# Patient Record
Sex: MALE | Race: WHITE | Employment: OTHER | URBAN - METROPOLITAN AREA
[De-identification: names, ages, dates, MRNs, and addresses within clinical notes are randomized per-mention and may not be internally consistent; named-entity substitution may affect disease eponyms.]

---

## 2017-02-26 ENCOUNTER — HOSPITAL ENCOUNTER (INPATIENT)
Facility: HOSPITAL | Age: 77
LOS: 2 days | Discharge: HOME | DRG: 194 | End: 2017-02-28
Attending: EMERGENCY MEDICINE | Admitting: HOSPITALIST
Payer: COMMERCIAL

## 2017-02-26 ENCOUNTER — APPOINTMENT (OUTPATIENT)
Dept: GENERAL RADIOLOGY | Facility: HOSPITAL | Age: 77
DRG: 194 | End: 2017-02-26
Attending: EMERGENCY MEDICINE
Payer: COMMERCIAL

## 2017-02-26 DIAGNOSIS — Z95.2 MECHANICAL HEART VALVE PRESENT: ICD-10-CM

## 2017-02-26 DIAGNOSIS — N17.9 AKI (ACUTE KIDNEY INJURY) (HCC): ICD-10-CM

## 2017-02-26 DIAGNOSIS — N28.9 RENAL INSUFFICIENCY: ICD-10-CM

## 2017-02-26 DIAGNOSIS — J18.9 COMMUNITY ACQUIRED PNEUMONIA: Primary | ICD-10-CM

## 2017-02-26 PROBLEM — Z91.81 AT RISK FOR FALLING: Status: ACTIVE | Noted: 2017-02-26

## 2017-02-26 LAB
ADENOVIRUS PCR:: NEGATIVE
ALBUMIN SERPL-MCNC: 4.1 G/DL (ref 3.5–4.8)
ALP LIVER SERPL-CCNC: 257 U/L (ref 45–117)
ALT SERPL-CCNC: 31 U/L (ref 17–63)
AST SERPL-CCNC: 27 U/L (ref 15–41)
B PERT DNA SPEC QL NAA+PROBE: NEGATIVE
BASOPHILS # BLD AUTO: 0.02 X10(3) UL (ref 0–0.1)
BASOPHILS NFR BLD AUTO: 0.3 %
BILIRUB SERPL-MCNC: 0.5 MG/DL (ref 0.1–2)
BUN BLD-MCNC: 72 MG/DL (ref 8–20)
C PNEUM DNA SPEC QL NAA+PROBE: NEGATIVE
CALCIUM BLD-MCNC: 8.5 MG/DL (ref 8.3–10.3)
CHLORIDE: 104 MMOL/L (ref 101–111)
CO2: 23 MMOL/L (ref 22–32)
CORONAVIRUS 229E PCR:: NEGATIVE
CORONAVIRUS HKU1 PCR:: NEGATIVE
CORONAVIRUS NL63 PCR:: NEGATIVE
CORONAVIRUS OC43 PCR:: NEGATIVE
CREAT BLD-MCNC: 2.49 MG/DL (ref 0.7–1.3)
EOSINOPHIL # BLD AUTO: 0.02 X10(3) UL (ref 0–0.3)
EOSINOPHIL NFR BLD AUTO: 0.3 %
ERYTHROCYTE [DISTWIDTH] IN BLOOD BY AUTOMATED COUNT: 14.6 % (ref 11.5–16)
EST. AVERAGE GLUCOSE BLD GHB EST-MCNC: 126 MG/DL (ref 68–126)
FLUAV H1 2009 PAND RNA SPEC QL NAA+PROBE: NEGATIVE
FLUAV H1 RNA SPEC QL NAA+PROBE: NEGATIVE
FLUAV H3 RNA SPEC QL NAA+PROBE: POSITIVE
FLUAV RNA SPEC QL NAA+PROBE: NEGATIVE
FLUBV RNA SPEC QL NAA+PROBE: POSITIVE
GLUCOSE BLD-MCNC: 116 MG/DL (ref 70–99)
GLUCOSE BLD-MCNC: 127 MG/DL (ref 65–99)
GLUCOSE BLD-MCNC: 134 MG/DL (ref 65–99)
GLUCOSE BLD-MCNC: 176 MG/DL (ref 65–99)
HBA1C MFR BLD HPLC: 6 % (ref ?–5.7)
HCT VFR BLD AUTO: 33.3 % (ref 37–53)
HGB BLD-MCNC: 11.4 G/DL (ref 13–17)
IMMATURE GRANULOCYTE COUNT: 0.04 X10(3) UL (ref 0–1)
IMMATURE GRANULOCYTE RATIO %: 0.6 %
INR BLD: 2.75 (ref 0.89–1.12)
LYMPHOCYTES # BLD AUTO: 0.59 X10(3) UL (ref 0.9–4)
LYMPHOCYTES NFR BLD AUTO: 8.5 %
M PROTEIN MFR SERPL ELPH: 7.5 G/DL (ref 6.1–8.3)
MCH RBC QN AUTO: 32.7 PG (ref 27–33.2)
MCHC RBC AUTO-ENTMCNC: 34.2 G/DL (ref 31–37)
MCV RBC AUTO: 95.4 FL (ref 80–99)
METAPNEUMOVIRUS PCR:: NEGATIVE
MONOCYTES # BLD AUTO: 0.9 X10(3) UL (ref 0.1–0.6)
MONOCYTES NFR BLD AUTO: 12.9 %
MYCOPLASMA PNEUMONIA PCR:: NEGATIVE
NEUTROPHIL ABS PRELIM: 5.39 X10 (3) UL (ref 1.3–6.7)
NEUTROPHILS # BLD AUTO: 5.39 X10(3) UL (ref 1.3–6.7)
NEUTROPHILS NFR BLD AUTO: 77.4 %
PARAINFLUENZA 1 PCR:: NEGATIVE
PARAINFLUENZA 2 PCR:: NEGATIVE
PARAINFLUENZA 3 PCR:: NEGATIVE
PARAINFLUENZA 4 PCR:: NEGATIVE
PLATELET # BLD AUTO: 90 10(3)UL (ref 150–450)
POTASSIUM SERPL-SCNC: 4.8 MMOL/L (ref 3.6–5.1)
PSA SERPL DL<=0.01 NG/ML-MCNC: 30.1 SECONDS (ref 12.3–14.8)
RBC # BLD AUTO: 3.49 X10(6)UL (ref 3.8–5.8)
RED CELL DISTRIBUTION WIDTH-SD: 50.1 FL (ref 35.1–46.3)
RHINOVIRUS/ENTERO PCR:: NEGATIVE
RSV RNA SPEC QL NAA+PROBE: NEGATIVE
SODIUM SERPL-SCNC: 138 MMOL/L (ref 136–144)
WBC # BLD AUTO: 7 X10(3) UL (ref 4–13)

## 2017-02-26 PROCEDURE — 99223 1ST HOSP IP/OBS HIGH 75: CPT | Performed by: STUDENT IN AN ORGANIZED HEALTH CARE EDUCATION/TRAINING PROGRAM

## 2017-02-26 PROCEDURE — 71020 XR CHEST PA + LAT CHEST (CPT=71020): CPT

## 2017-02-26 RX ORDER — WARFARIN SODIUM 2.5 MG/1
2.5 TABLET ORAL NIGHTLY
Status: DISCONTINUED | OUTPATIENT
Start: 2017-02-26 | End: 2017-02-28

## 2017-02-26 RX ORDER — IPRATROPIUM BROMIDE AND ALBUTEROL SULFATE 2.5; .5 MG/3ML; MG/3ML
3 SOLUTION RESPIRATORY (INHALATION) ONCE
Status: COMPLETED | OUTPATIENT
Start: 2017-02-26 | End: 2017-02-26

## 2017-02-26 RX ORDER — DEXTROSE MONOHYDRATE 25 G/50ML
50 INJECTION, SOLUTION INTRAVENOUS
Status: DISCONTINUED | OUTPATIENT
Start: 2017-02-26 | End: 2017-02-28

## 2017-02-26 RX ORDER — HYDRALAZINE HYDROCHLORIDE 25 MG/1
25 TABLET, FILM COATED ORAL 3 TIMES DAILY
Status: DISCONTINUED | OUTPATIENT
Start: 2017-02-26 | End: 2017-02-28

## 2017-02-26 RX ORDER — TERAZOSIN 5 MG/1
5 CAPSULE ORAL NIGHTLY
Status: DISCONTINUED | OUTPATIENT
Start: 2017-02-26 | End: 2017-02-28

## 2017-02-26 RX ORDER — TRAZODONE HYDROCHLORIDE 50 MG/1
50 TABLET ORAL NIGHTLY PRN
Status: DISCONTINUED | OUTPATIENT
Start: 2017-02-26 | End: 2017-02-28

## 2017-02-26 RX ORDER — ACETAMINOPHEN 500 MG
1000 TABLET ORAL ONCE
Status: COMPLETED | OUTPATIENT
Start: 2017-02-26 | End: 2017-02-26

## 2017-02-26 RX ORDER — ATORVASTATIN CALCIUM 20 MG/1
20 TABLET, FILM COATED ORAL NIGHTLY
Status: DISCONTINUED | OUTPATIENT
Start: 2017-02-26 | End: 2017-02-28

## 2017-02-26 RX ORDER — TERAZOSIN 5 MG/1
5 CAPSULE ORAL DAILY
COMMUNITY

## 2017-02-26 RX ORDER — PHENOBARBITAL 97.2 MG/1
32.4 TABLET ORAL 3 TIMES DAILY
COMMUNITY
End: 2018-08-23

## 2017-02-26 RX ORDER — WARFARIN SODIUM 2.5 MG/1
1 TABLET ORAL NIGHTLY
COMMUNITY
End: 2018-08-23

## 2017-02-26 RX ORDER — CARVEDILOL 3.12 MG/1
3.12 TABLET ORAL 2 TIMES DAILY WITH MEALS
Status: DISCONTINUED | OUTPATIENT
Start: 2017-02-26 | End: 2017-02-28

## 2017-02-26 RX ORDER — LISINOPRIL 5 MG/1
5 TABLET ORAL DAILY
COMMUNITY
End: 2018-08-23

## 2017-02-26 RX ORDER — FOLIC ACID 1 MG/1
TABLET ORAL DAILY
COMMUNITY
End: 2018-08-23

## 2017-02-26 RX ORDER — SODIUM CHLORIDE 9 MG/ML
INJECTION, SOLUTION INTRAVENOUS CONTINUOUS
Status: ACTIVE | OUTPATIENT
Start: 2017-02-26 | End: 2017-02-27

## 2017-02-26 RX ORDER — ACETAMINOPHEN 325 MG/1
650 TABLET ORAL EVERY 6 HOURS PRN
Status: DISCONTINUED | OUTPATIENT
Start: 2017-02-26 | End: 2017-02-28

## 2017-02-26 RX ORDER — ONDANSETRON 2 MG/ML
4 INJECTION INTRAMUSCULAR; INTRAVENOUS EVERY 6 HOURS PRN
Status: DISCONTINUED | OUTPATIENT
Start: 2017-02-26 | End: 2017-02-28

## 2017-02-26 RX ORDER — OSELTAMIVIR PHOSPHATE 75 MG/1
75 CAPSULE ORAL DAILY
Status: DISCONTINUED | OUTPATIENT
Start: 2017-02-26 | End: 2017-02-28

## 2017-02-26 RX ORDER — HYDRALAZINE HYDROCHLORIDE 25 MG/1
25 TABLET, FILM COATED ORAL 3 TIMES DAILY
COMMUNITY
End: 2018-08-23

## 2017-02-26 RX ORDER — HYDROCHLOROTHIAZIDE 25 MG/1
25 TABLET ORAL DAILY
Status: ON HOLD | COMMUNITY
End: 2017-02-28

## 2017-02-26 RX ORDER — CARVEDILOL 3.12 MG/1
3.12 TABLET ORAL 2 TIMES DAILY WITH MEALS
COMMUNITY
End: 2018-08-23

## 2017-02-26 RX ORDER — FUROSEMIDE 20 MG/1
20 TABLET ORAL DAILY
COMMUNITY
End: 2018-08-23

## 2017-02-26 RX ORDER — ATORVASTATIN CALCIUM 20 MG/1
20 TABLET, FILM COATED ORAL NIGHTLY
COMMUNITY

## 2017-02-26 RX ORDER — GUAIFENESIN 600 MG
600 TABLET, EXTENDED RELEASE 12 HR ORAL 2 TIMES DAILY
Status: DISCONTINUED | OUTPATIENT
Start: 2017-02-26 | End: 2017-02-28

## 2017-02-26 RX ORDER — GLIPIZIDE 5 MG/1
5 TABLET ORAL 2 TIMES DAILY
COMMUNITY
End: 2018-08-23

## 2017-02-26 RX ORDER — LAMOTRIGINE 150 MG/1
150 TABLET ORAL 2 TIMES DAILY
COMMUNITY
End: 2018-08-23

## 2017-02-26 NOTE — H&P
[facility] HOSPITALIST  History and Physical     Maynor Slade Patient Status:  Emergency    1940 MRN [id number]   Location [facility] [facility] Attending Love Page MD   Hosp Day # 0 PCP Nik Mackey DO     Chief Complai nontender, nondistended. Positive bowel sounds. No rebound, guarding or organomegaly. Neurologic: No focal neurological deficits. CNII-XII grossly intact. Musculoskeletal: Moves all extremities. Extremities: +2 edema   Integument: No rashes or lesions.

## 2017-02-26 NOTE — ED PROVIDER NOTES
Patient Seen in: [facility] Emergency Department    History   Patient presents with:  Cough/URI    Stated Complaint: cough, no fever, no shortness of breath    HPI    66-year-old white male who presents to the emergency room today for complaint of c stated complaint: cough, no fever, no shortness of breath  Other systems are as noted in HPI. Constitutional and vital signs reviewed. All other systems reviewed and negative except as noted above.     PSFH elements reviewed from today and agreed exce INR 2.75 (*)     All other components within normal limits   CBC W/ DIFFERENTIAL - Abnormal; Notable for the following:     RBC 3.49 (*)     HGB 11.4 (*)     HCT 33.3 (*)     PLT 90.0 (*)     RDW-SD 50.1 (*)     Lymphocyte Absolute 0.59 (*)     Monocyte Ab insufficiency    Disposition:  Admit    Follow-up:  No follow-up provider specified.     Medications Prescribed:  Current Discharge Medication List        Present on Admission           ICD-10-CM Noted POA    Community acquired pneumonia J18.9 2/26/2017 Diane

## 2017-02-26 NOTE — CONSULTS
[facility]  Report of Consultation    Ronnie Mc Patient Status:  Inpatient    1940 MRN [id number]   [facility] 5NW-A Attending Bubba Salinas MD   Hosp Day # 0 PCP Lamar Loomis DO     Reason for Consultation:  Valve Nightly PRN  •  ondansetron HCl (ZOFRAN) injection 4 mg, 4 mg, Intravenous, Q6H PRN  •  glucose (DEX4) oral liquid 15 g, 15 g, Oral, Q15 Min PRN **OR** Glucose-Vitamin C (DEX-4) 4-0.006 g chewable tab 4 tablet, 4 tablet, Oral, Q15 Min PRN **OR** dextrose i MD  2/26/2017  2:40 PM

## 2017-02-27 ENCOUNTER — APPOINTMENT (OUTPATIENT)
Dept: ULTRASOUND IMAGING | Facility: HOSPITAL | Age: 77
DRG: 194 | End: 2017-02-27
Attending: STUDENT IN AN ORGANIZED HEALTH CARE EDUCATION/TRAINING PROGRAM
Payer: COMMERCIAL

## 2017-02-27 ENCOUNTER — APPOINTMENT (OUTPATIENT)
Dept: CV DIAGNOSTICS | Facility: HOSPITAL | Age: 77
DRG: 194 | End: 2017-02-27
Attending: INTERNAL MEDICINE
Payer: COMMERCIAL

## 2017-02-27 LAB
ALBUMIN SERPL-MCNC: 3.3 G/DL (ref 3.5–4.8)
ALP LIVER SERPL-CCNC: 196 U/L (ref 45–117)
ALT SERPL-CCNC: 24 U/L (ref 17–63)
AST SERPL-CCNC: 25 U/L (ref 15–41)
ATRIAL RATE: 208 BPM
BASOPHILS # BLD AUTO: 0.02 X10(3) UL (ref 0–0.1)
BASOPHILS NFR BLD AUTO: 0.4 %
BILIRUB SERPL-MCNC: 0.4 MG/DL (ref 0.1–2)
BUN BLD-MCNC: 74 MG/DL (ref 8–20)
CALCIUM BLD-MCNC: 8.2 MG/DL (ref 8.3–10.3)
CHLORIDE: 109 MMOL/L (ref 101–111)
CO2: 18 MMOL/L (ref 22–32)
CREAT BLD-MCNC: 2.39 MG/DL (ref 0.7–1.3)
DEPRECATED HBV CORE AB SER IA-ACNC: 243.9 NG/ML (ref 22–322)
EOSINOPHIL # BLD AUTO: 0.01 X10(3) UL (ref 0–0.3)
EOSINOPHIL NFR BLD AUTO: 0.2 %
ERYTHROCYTE [DISTWIDTH] IN BLOOD BY AUTOMATED COUNT: 14.7 % (ref 11.5–16)
FOLATE (FOLIC ACID), SERUM: 17.2 NG/ML (ref 8.7–24)
FREE T4: 1.2 NG/DL (ref 0.9–1.8)
GLUCOSE BLD-MCNC: 101 MG/DL (ref 65–99)
GLUCOSE BLD-MCNC: 111 MG/DL (ref 65–99)
GLUCOSE BLD-MCNC: 111 MG/DL (ref 70–99)
GLUCOSE BLD-MCNC: 182 MG/DL (ref 65–99)
GLUCOSE BLD-MCNC: 197 MG/DL (ref 65–99)
HAV AB SERPL IA-ACNC: 756 PG/ML (ref 193–986)
HCT VFR BLD AUTO: 28.1 % (ref 37–53)
HGB BLD-MCNC: 9.3 G/DL (ref 13–17)
IMMATURE GRANULOCYTE COUNT: 0.03 X10(3) UL (ref 0–1)
IMMATURE GRANULOCYTE RATIO %: 0.6 %
IRON SATURATION: 7 % (ref 13–45)
IRON: 17 UG/DL (ref 45–182)
LYMPHOCYTES # BLD AUTO: 0.63 X10(3) UL (ref 0.9–4)
LYMPHOCYTES NFR BLD AUTO: 11.7 %
M PROTEIN MFR SERPL ELPH: 6.2 G/DL (ref 6.1–8.3)
MCH RBC QN AUTO: 32.2 PG (ref 27–33.2)
MCHC RBC AUTO-ENTMCNC: 33.1 G/DL (ref 31–37)
MCV RBC AUTO: 97.2 FL (ref 80–99)
MONOCYTES # BLD AUTO: 0.9 X10(3) UL (ref 0.1–0.6)
MONOCYTES NFR BLD AUTO: 16.7 %
NEUTROPHIL ABS PRELIM: 3.81 X10 (3) UL (ref 1.3–6.7)
NEUTROPHILS # BLD AUTO: 3.81 X10(3) UL (ref 1.3–6.7)
NEUTROPHILS NFR BLD AUTO: 70.4 %
PLATELET # BLD AUTO: 71 10(3)UL (ref 150–450)
POTASSIUM SERPL-SCNC: 4.6 MMOL/L (ref 3.6–5.1)
Q-T INTERVAL: 418 MS
QRS DURATION: 138 MS
QTC CALCULATION (BEZET): 485 MS
R AXIS: -64 DEGREES
RBC # BLD AUTO: 2.89 X10(6)UL (ref 3.8–5.8)
RED CELL DISTRIBUTION WIDTH-SD: 52 FL (ref 35.1–46.3)
SODIUM SERPL-SCNC: 140 MMOL/L (ref 136–144)
T AXIS: 112 DEGREES
TOTAL IRON BINDING CAPACITY: 228 UG/DL (ref 298–536)
TRANSFERRIN: 153 MG/DL (ref 200–360)
TSI SER-ACNC: 0.08 MIU/ML (ref 0.35–5.5)
VENTRICULAR RATE: 81 BPM
WBC # BLD AUTO: 5.4 X10(3) UL (ref 4–13)

## 2017-02-27 PROCEDURE — 93306 TTE W/DOPPLER COMPLETE: CPT

## 2017-02-27 PROCEDURE — 99233 SBSQ HOSP IP/OBS HIGH 50: CPT | Performed by: STUDENT IN AN ORGANIZED HEALTH CARE EDUCATION/TRAINING PROGRAM

## 2017-02-27 PROCEDURE — 93306 TTE W/DOPPLER COMPLETE: CPT | Performed by: INTERNAL MEDICINE

## 2017-02-27 PROCEDURE — 76770 US EXAM ABDO BACK WALL COMP: CPT

## 2017-02-27 RX ORDER — BENZONATATE 200 MG/1
200 CAPSULE ORAL 3 TIMES DAILY PRN
Status: DISCONTINUED | OUTPATIENT
Start: 2017-02-27 | End: 2017-02-28

## 2017-02-27 RX ORDER — ALBUTEROL SULFATE 2.5 MG/3ML
2.5 SOLUTION RESPIRATORY (INHALATION)
Status: DISCONTINUED | OUTPATIENT
Start: 2017-02-27 | End: 2017-02-28

## 2017-02-27 NOTE — PROGRESS NOTES
[facility]  Progress Note    Yulissa Benites Patient Status:  Inpatient    1940 MRN [id number]   [facility] 5NW-A Attending Gerry Negro MD   Hosp Day # 1 PCP Keanu Thomas DO       Subjective:  Sitting up in chair.  Still c/ HCl  25 mg Oral TID   • lamoTRIgine  150 mg Oral BID   • PHENobarbital  97.2 mg Oral Daily   • Terazosin HCl  5 mg Oral Nightly   • Warfarin Sodium  2.5 mg Oral Nightly   • cefTRIAXone  1 g Intravenous Q24H   • azithromycin  500 mg Intravenous Q24H   • ins myself upon discharge for cardiac follow-up. Would resume ACE-I and lasix upon discharge; upon follow-up in 1-2 weeks would get repeat BMP and follow BP to see if can increase ACE-I rather than resume HCTZ.     Evy Powell MD

## 2017-02-27 NOTE — PLAN OF CARE
CARDIOVASCULAR - ADULT    • Maintains optimal cardiac output and hemodynamic stability Progressing        Diabetes/Glucose Control    • Glucose maintained within prescribed range Progressing        Impaired Functional Mobility    • Achieve highest/safest l

## 2017-02-27 NOTE — PAYOR COMM NOTE
Attending Physician: Carlos Rossi MD    Review Type: ADMISSION   Reviewer: Jorge James       Date: February 27, 2017 - 12:01 PM  Payor: [address] [place] [address] Road Number: :[id number]  Admit date: 2/26/2017 10:09 AM   Admitted from Emergency Dept. 2/26/2017 1206 New Bag 1 g Intravenous Pastor Francesca RN      CefTRIAXone Sodium (ROCEPHIN) 1 g in sodium chloride 0.9 % 100 mL IVPB Add-vantage     Date Action Dose Route User    2/27/2017 1108 New Bag 1 g Intravenous [facility]      phenobarbital (LUMINAL) tab 97.2 mg     Date Action Dose Route User    2/27/2017 0827 Given 97.2 mg Oral Daniela Mantilla RN    2/26/2017 1448 Given 97.2 mg Oral Adalberto Young RN          RESULTS LAST 24HRS:  Labs Reviewed   PROTHROMBIN cards  4. Afib on AG  1. Continue BB  2. Continue coumadin    5.  Mechanical Aortic and Mitral Valve - continue coumadin, daily Pt/INR

## 2017-02-27 NOTE — CM/SW NOTE
02/27/17 1600   CM/SW Referral Data   Referral Source Physician   Reason for Referral Discharge planning;Psychoscial assessment   Informant Patient   Pertinent Medical Hx   Primary Care Physician Name Sylvia Patricia.    Patient Info   Patient's Mental Status

## 2017-02-27 NOTE — PROGRESS NOTES
Pt is a 69 y/o male admitted with PNA. alert oriented. denies SOB. 02 sats 93-97% on room air. no swallowing difficulty noted. HOB elevated. severe cough. Robitussin given,pt is on iv zithromax,Rocephin and po tamiflu. PT/OT eval.up in the chair. no fever,N/V noted

## 2017-02-27 NOTE — PROGRESS NOTES
[facility]PITAL[facility]  Progress Note     Luis Williamson Patient Status:  Inpatient    1940 MRN [id number]   [facility] 5NW-A Attending Cole Centeno MD   Hosp Day # 1 PCP Melanie Hidalgo DO     Chief Complaint: Fatigue, cough      S: carvedilol  3.125 mg Oral BID with meals   • hydrALAzine HCl  25 mg Oral TID   • lamoTRIgine  150 mg Oral BID   • PHENobarbital  97.2 mg Oral Daily   • Terazosin HCl  5 mg Oral Nightly   • Warfarin Sodium  2.5 mg Oral Nightly   • cefTRIAXone  1 g Intraveno

## 2017-02-27 NOTE — PHYSICAL THERAPY NOTE
PHYSICAL THERAPY EVALUATION - INPATIENT     Room Number: 855/268-X  Evaluation Date: 2/27/2017  Type of Evaluation: Initial  Physician Order: PT Eval and Treat    Presenting Problem: Pneumonia  Reason for Therapy: Mobility Dysfunction and Discharge Wayne identify errors made and assistance required to correct errors made  · Awareness of Deficits:  decreased awareness of deficits  · Problem Solving:  assistance required to identify errors made, assistance required to generate solutions and assistance requir front of him. Pt stating was awaiting someone to assist with \"getting to food. \"  Pt demos supine to sit with min A. Sit to/from stand with min A. Ambulation x5ft without AD and min A.  x145ft with RW with CGA. Pt with short shuffled gait.   Noted to h negotiate 1 flight of stairs with supervision to ensure safety at home. Goal #5 Patient will tolerate standardized assessment within 3 visits.     Goal #6    Goal Comments: Goals established on 2/27/2017

## 2017-02-27 NOTE — PROGRESS NOTES
[facility] Pharmacy Note:  Renal Adjustment for Tamiflu (oseltamivir)    Elo Garcia is a 68year old male who has been prescribed Tamiflu (oseltamivir) 75 mg every 12 hrs. CrCl is estimated creatinine clearance is 23.6 mL/min (based on Cr of 2.49).  so the

## 2017-02-27 NOTE — PROGRESS NOTES
RECEIVED PATIENT AT 1400, ALERT & ORIENTED. STABLE. CARDIOLOGY NOTIFIED OF EF RESULTS AND IV FLUIDS, MONITOR.

## 2017-02-28 VITALS
DIASTOLIC BLOOD PRESSURE: 74 MMHG | BODY MASS INDEX: 26.99 KG/M2 | OXYGEN SATURATION: 99 % | TEMPERATURE: 98 F | HEIGHT: 67 IN | WEIGHT: 171.94 LBS | RESPIRATION RATE: 18 BRPM | HEART RATE: 75 BPM | SYSTOLIC BLOOD PRESSURE: 115 MMHG

## 2017-02-28 LAB
BASOPHILS # BLD AUTO: 0.03 X10(3) UL (ref 0–0.1)
BASOPHILS NFR BLD AUTO: 1 %
BUN BLD-MCNC: 65 MG/DL (ref 8–20)
CALCIUM BLD-MCNC: 8.6 MG/DL (ref 8.3–10.3)
CHLORIDE: 112 MMOL/L (ref 101–111)
CO2: 18 MMOL/L (ref 22–32)
CREAT BLD-MCNC: 1.88 MG/DL (ref 0.7–1.3)
EOSINOPHIL # BLD AUTO: 0.09 X10(3) UL (ref 0–0.3)
EOSINOPHIL NFR BLD AUTO: 3.1 %
ERYTHROCYTE [DISTWIDTH] IN BLOOD BY AUTOMATED COUNT: 14.8 % (ref 11.5–16)
GLUCOSE BLD-MCNC: 115 MG/DL (ref 65–99)
GLUCOSE BLD-MCNC: 120 MG/DL (ref 70–99)
GLUCOSE BLD-MCNC: 152 MG/DL (ref 65–99)
HCT VFR BLD AUTO: 28.5 % (ref 37–53)
HGB BLD-MCNC: 9.6 G/DL (ref 13–17)
IMMATURE GRANULOCYTE COUNT: 0.02 X10(3) UL (ref 0–1)
IMMATURE GRANULOCYTE RATIO %: 0.7 %
INR BLD: 3.48 (ref 0.89–1.12)
LYMPHOCYTES # BLD AUTO: 0.56 X10(3) UL (ref 0.9–4)
LYMPHOCYTES NFR BLD AUTO: 19.3 %
MCH RBC QN AUTO: 32.9 PG (ref 27–33.2)
MCHC RBC AUTO-ENTMCNC: 33.7 G/DL (ref 31–37)
MCV RBC AUTO: 97.6 FL (ref 80–99)
MONOCYTES # BLD AUTO: 0.38 X10(3) UL (ref 0.1–0.6)
MONOCYTES NFR BLD AUTO: 13.1 %
NEUTROPHIL ABS PRELIM: 1.82 X10 (3) UL (ref 1.3–6.7)
NEUTROPHILS # BLD AUTO: 1.82 X10(3) UL (ref 1.3–6.7)
NEUTROPHILS NFR BLD AUTO: 62.8 %
PLATELET # BLD AUTO: 65 10(3)UL (ref 150–450)
POTASSIUM SERPL-SCNC: 4.1 MMOL/L (ref 3.6–5.1)
PSA SERPL DL<=0.01 NG/ML-MCNC: 36.3 SECONDS (ref 12.3–14.8)
RBC # BLD AUTO: 2.92 X10(6)UL (ref 3.8–5.8)
RED CELL DISTRIBUTION WIDTH-SD: 52.5 FL (ref 35.1–46.3)
SODIUM SERPL-SCNC: 142 MMOL/L (ref 136–144)
WBC # BLD AUTO: 2.9 X10(3) UL (ref 4–13)

## 2017-02-28 RX ORDER — ALBUTEROL SULFATE 2.5 MG/3ML
2.5 SOLUTION RESPIRATORY (INHALATION)
Status: DISCONTINUED | OUTPATIENT
Start: 2017-02-28 | End: 2017-02-28

## 2017-02-28 RX ORDER — OSELTAMIVIR PHOSPHATE 75 MG/1
75 CAPSULE ORAL DAILY
Qty: 2 CAPSULE | Refills: 0 | Status: SHIPPED | OUTPATIENT
Start: 2017-02-28 | End: 2017-03-02

## 2017-02-28 RX ORDER — CEFDINIR 300 MG/1
300 CAPSULE ORAL 2 TIMES DAILY
Qty: 10 CAPSULE | Refills: 0 | Status: SHIPPED | OUTPATIENT
Start: 2017-02-28 | End: 2017-03-05

## 2017-02-28 RX ORDER — BENZONATATE 100 MG/1
100 CAPSULE ORAL 3 TIMES DAILY PRN
Qty: 30 CAPSULE | Refills: 0 | Status: SHIPPED | OUTPATIENT
Start: 2017-02-28 | End: 2018-08-23

## 2017-02-28 RX ORDER — ALBUTEROL SULFATE 90 UG/1
1 AEROSOL, METERED RESPIRATORY (INHALATION) EVERY 4 HOURS PRN
Qty: 1 INHALER | Refills: 0 | Status: SHIPPED | OUTPATIENT
Start: 2017-02-28 | End: 2018-08-23

## 2017-02-28 RX ORDER — GUAIFENESIN 600 MG
600 TABLET, EXTENDED RELEASE 12 HR ORAL 2 TIMES DAILY
Qty: 20 TABLET | Refills: 0 | Status: SHIPPED | OUTPATIENT
Start: 2017-02-28 | End: 2018-08-23

## 2017-02-28 RX ORDER — WARFARIN SODIUM 2.5 MG/1
2.5 TABLET ORAL NIGHTLY
Qty: 30 TABLET | Refills: 0 | Status: SHIPPED | OUTPATIENT
Start: 2017-02-28 | End: 2018-08-23

## 2017-02-28 NOTE — PROGRESS NOTES
[facility]  Cardiology Progress Note    Naheed Florez Patient Status:  Inpatient    1940 MRN [id number]   [facility] 5NW-A Attending Carolina Das MD   Hosp Day # 2 PCP Valentine Vazquez DO     Subjective:  Up in chair.  Product 500 mg Intravenous Q24H   • insulin aspart  1-5 Units Subcutaneous TID CC and HS   • GuaiFENesin ER  600 mg Oral BID   • Oseltamivir Phosphate  75 mg Oral Daily          Assessment:  · Pneumonia,  +Influenza A and B - antibiotics and Tamiflu per primary  ·

## 2017-02-28 NOTE — OCCUPATIONAL THERAPY NOTE
OCCUPATIONAL THERAPY EVALUATION - INPATIENT     Room Number: 023/786-W  Evaluation Date: 2/28/2017  Type of Evaluation: Initial  Presenting Problem: PNA and influenza     Physician Order: IP Consult to Occupational Therapy  Reason for Therapy: ADL/IADL Dys COGNITION  Safety Judgement:  decreased awareness of need for assistance and decreased awareness of need for safety  Awareness of Deficits:  decreased awareness of deficits  Problem Solving:  assistance required to identify errors made, assistance requ performed supine>sit EOB with moderate assistance. Pt performed sit>stand with RW and CG assistance with min verbal/visual/tactile cues for safety with RW use and hand placement.  Pt performed functional mobility with min assistance and RW x approx 75ft>bed Goals  Patient will transfer from bed to chair:  with supervision  Patient will transfer from supine to sit:  with supervision  Patient will transfer from sit to stand:  with supervision  Patient will transfer to toilet:  with supervision    RAVINE Dhara Pr

## 2017-02-28 NOTE — PROGRESS NOTES
Assumed care of pt at 2230. Pt asleep at this time. Pt maintaining O2 saturation >90% on room air, . Pt on tele, afib with PVCs. Pt up with assist, BRP, bedrest at this time, fall precautions in place. IVF infusing at [facility].  Frequent rounding, needs met

## 2017-02-28 NOTE — DISCHARGE SUMMARY
[facility] [place] HOSPITALIST  DISCHARGE SUMMARY     Kelly Florian Patient Status:  Inpatient    1940 MRN [id number]   [facility] 5NW-A Attending Veronica Whalen MD   [facility] Day # 2 PCP Andrew Collins DO     Date of Admission: 2017  Date o positive for influenza A and B. He was started on Tamiflu and continued on Rocephin and azithromycin IV. He continued on nebs, was stable on room air. No growth to date on blood cultures, sputum sample was poor.  He continued on hyperglycemia protocol for 1 capsule (75 mg total) by mouth daily.     Stop taking on:  3/2/2017   Quantity:  2 capsule   Refills:  0         CHANGE how you take these medications       Instructions Prescription details    Warfarin Sodium 2.5 MG Tabs   Last time this was given:  2.5 by mouth 2 (two) times daily. Refills:  0       lisinopril 5 MG Tabs   Commonly known as:  PRINIVIL,ZESTRIL        Take 5 mg by mouth daily.     Refills:  0       PHENobarbital 97.2 MG Tabs   Last time this was given:  2/28/2017  8:28 AM   Commonly known

## 2017-02-28 NOTE — PAYOR COMM NOTE
Attending Physician: Aidan Garcia MD    2/27    Chief Complaint: Fatigue, cough    S: Patient sitting in chair, continues to cough. Still congested,appears sick. Respiratory: Clear to auscultation bilaterally. DIGNA wheezes  wheezes. No rhonchi.     Vit baseline    4. Afib on AG  5. Mechanical Aortic and Mitral Valves    6. H/o CVA  7. BPH  8. DM type 2- A1c= 6, not on LT insulin    9.  Anemia- Iron/ B12/Foalte levels normal- likely anemia of Chronic disease    Plan of care:    Continue Abx for CAP- will n

## 2017-02-28 NOTE — CM/SW NOTE
SW received call back from pt's family indicating that previous [facility] agency used was [facility], which they would like to use again. Referral initiated to [facility] via [facility]. / to remain available for support and/or discharge planning.

## 2017-02-28 NOTE — PROGRESS NOTES
[facility]PITAL[facility]  Progress Note     Johny Carrillo Patient Status:  Inpatient    1940 MRN [id number]   [facility] 5NW-A Attending Miguel Angel Lin MD   Hosp Day # 2 PCP Mela Branch, DO     Chief Complaint: Fatigue, cough      S: carvedilol  3.125 mg Oral BID with meals   • hydrALAzine HCl  25 mg Oral TID   • lamoTRIgine  150 mg Oral BID   • PHENobarbital  97.2 mg Oral Daily   • Terazosin HCl  5 mg Oral Nightly   • Warfarin Sodium  2.5 mg Oral Nightly   • cefTRIAXone  1 g Intraveno edema  Plan  -Influenza A and B URI- continue Tamiflu, cough suppressant  - ? Cough variant asthma - nebs , d/c home on inh PRN  - Hold coumadin today, resume on 3/2, will need INR f/u as OP, currently = 3.48 .  For mechanical valve goal is 2.5-3.5   - Echo

## 2017-03-01 NOTE — CM/SW NOTE
Patient discharged on 02/28/2017 as previously planned.          03/01/17 0800   Discharge disposition   Discharged to: Home-Health   Name of [address] services after discharge Skilled home care   Discharge transportati

## 2017-03-01 NOTE — PLAN OF CARE
CARDIOVASCULAR - ADULT    • Maintains optimal cardiac output and hemodynamic stability Adequate for Discharge        Diabetes/Glucose Control    • Glucose maintained within prescribed range Adequate for Discharge        Impaired Activities of Daily Living

## 2018-06-22 ENCOUNTER — LAB REQUISITION (OUTPATIENT)
Dept: LAB | Facility: HOSPITAL | Age: 78
End: 2018-06-22
Attending: NURSE PRACTITIONER
Payer: COMMERCIAL

## 2018-06-22 DIAGNOSIS — N18.9 CHRONIC KIDNEY DISEASE: ICD-10-CM

## 2018-06-22 DIAGNOSIS — E86.0 DEHYDRATION: ICD-10-CM

## 2018-06-22 PROCEDURE — 80053 COMPREHEN METABOLIC PANEL: CPT | Performed by: NURSE PRACTITIONER

## 2018-06-26 ENCOUNTER — LAB REQUISITION (OUTPATIENT)
Dept: LAB | Facility: HOSPITAL | Age: 78
End: 2018-06-26
Attending: NURSE PRACTITIONER
Payer: COMMERCIAL

## 2018-06-26 DIAGNOSIS — R31.9 HEMATURIA: ICD-10-CM

## 2018-06-26 PROCEDURE — 85610 PROTHROMBIN TIME: CPT | Performed by: NURSE PRACTITIONER

## 2018-06-26 PROCEDURE — 85025 COMPLETE CBC W/AUTO DIFF WBC: CPT | Performed by: NURSE PRACTITIONER

## 2018-06-26 PROCEDURE — 80053 COMPREHEN METABOLIC PANEL: CPT | Performed by: NURSE PRACTITIONER

## 2018-08-23 ENCOUNTER — APPOINTMENT (OUTPATIENT)
Dept: CT IMAGING | Facility: HOSPITAL | Age: 78
DRG: 086 | End: 2018-08-23
Attending: EMERGENCY MEDICINE
Payer: COMMERCIAL

## 2018-08-23 ENCOUNTER — HOSPITAL ENCOUNTER (INPATIENT)
Facility: HOSPITAL | Age: 78
LOS: 6 days | Discharge: HOSPICE | DRG: 086 | End: 2018-08-29
Attending: EMERGENCY MEDICINE | Admitting: HOSPITALIST
Payer: COMMERCIAL

## 2018-08-23 ENCOUNTER — APPOINTMENT (OUTPATIENT)
Dept: GENERAL RADIOLOGY | Facility: HOSPITAL | Age: 78
DRG: 086 | End: 2018-08-23
Attending: Other
Payer: COMMERCIAL

## 2018-08-23 DIAGNOSIS — S06.6X0A SUBARACHNOID HEMORRHAGE FOLLOWING INJURY, NO LOSS OF CONSCIOUSNESS, INITIAL ENCOUNTER (HCC): ICD-10-CM

## 2018-08-23 DIAGNOSIS — D68.9 COAGULOPATHY (HCC): ICD-10-CM

## 2018-08-23 DIAGNOSIS — S06.5X9A ACUTE SUBDURAL HEMATOMA (HCC): Primary | ICD-10-CM

## 2018-08-23 LAB
ALBUMIN SERPL-MCNC: 3.1 G/DL (ref 3.5–4.8)
ALBUMIN/GLOB SERPL: 0.8 {RATIO} (ref 1–2)
ALP LIVER SERPL-CCNC: 274 U/L (ref 45–117)
ALT SERPL-CCNC: 18 U/L (ref 17–63)
ANION GAP SERPL CALC-SCNC: 6 MMOL/L (ref 0–18)
APTT PPP: 71.1 SECONDS (ref 26.1–34.6)
AST SERPL-CCNC: 42 U/L (ref 15–41)
ATRIAL RATE: 54 BPM
BASOPHILS # BLD AUTO: 0.02 X10(3) UL (ref 0–0.1)
BASOPHILS NFR BLD AUTO: 0.6 %
BILIRUB SERPL-MCNC: 0.5 MG/DL (ref 0.1–2)
BILIRUB UR QL STRIP.AUTO: NEGATIVE
BUN BLD-MCNC: 27 MG/DL (ref 8–20)
BUN/CREAT SERPL: 16.2 (ref 10–20)
CALCIUM BLD-MCNC: 8.5 MG/DL (ref 8.3–10.3)
CHLORIDE SERPL-SCNC: 110 MMOL/L (ref 101–111)
CHOLEST SMN-MCNC: 92 MG/DL (ref ?–200)
CO2 SERPL-SCNC: 26 MMOL/L (ref 22–32)
COLOR UR AUTO: YELLOW
CREAT BLD-MCNC: 1.67 MG/DL (ref 0.7–1.3)
EOSINOPHIL # BLD AUTO: 0.11 X10(3) UL (ref 0–0.3)
EOSINOPHIL NFR BLD AUTO: 3.6 %
ERYTHROCYTE [DISTWIDTH] IN BLOOD BY AUTOMATED COUNT: 20.3 % (ref 11.5–16)
EST. AVERAGE GLUCOSE BLD GHB EST-MCNC: 126 MG/DL (ref 68–126)
GLOBULIN PLAS-MCNC: 3.7 G/DL (ref 2.5–4)
GLUCOSE BLD-MCNC: 109 MG/DL (ref 65–99)
GLUCOSE BLD-MCNC: 111 MG/DL (ref 65–99)
GLUCOSE BLD-MCNC: 133 MG/DL (ref 70–99)
GLUCOSE UR STRIP.AUTO-MCNC: NEGATIVE MG/DL
HBA1C MFR BLD HPLC: 6 % (ref ?–5.7)
HCT VFR BLD AUTO: 32.1 % (ref 37–53)
HDLC SERPL-MCNC: 53 MG/DL (ref 40–59)
HGB BLD-MCNC: 10.4 G/DL (ref 13–17)
IMMATURE GRANULOCYTE COUNT: 0 X10(3) UL (ref 0–1)
IMMATURE GRANULOCYTE RATIO %: 0 %
INR BLD: 5.24 (ref 0.9–1.1)
INR BLD: 5.59 (ref 0.9–1.1)
KETONES UR STRIP.AUTO-MCNC: NEGATIVE MG/DL
LDLC SERPL CALC-MCNC: 26 MG/DL (ref ?–100)
LYMPHOCYTES # BLD AUTO: 0.72 X10(3) UL (ref 0.9–4)
LYMPHOCYTES NFR BLD AUTO: 23.4 %
M PROTEIN MFR SERPL ELPH: 6.8 G/DL (ref 6.1–8.3)
MCH RBC QN AUTO: 28.7 PG (ref 27–33.2)
MCHC RBC AUTO-ENTMCNC: 32.4 G/DL (ref 31–37)
MCV RBC AUTO: 88.4 FL (ref 80–99)
MONOCYTES # BLD AUTO: 0.36 X10(3) UL (ref 0.1–1)
MONOCYTES NFR BLD AUTO: 11.7 %
NEUTROPHIL ABS PRELIM: 1.87 X10 (3) UL (ref 1.3–6.7)
NEUTROPHILS # BLD AUTO: 1.87 X10(3) UL (ref 1.3–6.7)
NEUTROPHILS NFR BLD AUTO: 60.7 %
NITRITE UR QL STRIP.AUTO: POSITIVE
NONHDLC SERPL-MCNC: 39 MG/DL (ref ?–130)
OSMOLALITY SERPL CALC.SUM OF ELEC: 301 MOSM/KG (ref 275–295)
PH UR STRIP.AUTO: 6 [PH] (ref 4.5–8)
PHENOBARBITAL: 33.1 UG/ML (ref 15–40)
PLATELET # BLD AUTO: 76 10(3)UL (ref 150–450)
PLATELET MORPHOLOGY: NORMAL
POTASSIUM SERPL-SCNC: 5.2 MMOL/L (ref 3.6–5.1)
PROT UR STRIP.AUTO-MCNC: NEGATIVE MG/DL
PSA SERPL DL<=0.01 NG/ML-MCNC: 49.6 SECONDS (ref 12.4–14.7)
PSA SERPL DL<=0.01 NG/ML-MCNC: 52.2 SECONDS (ref 12.4–14.7)
Q-T INTERVAL: 482 MS
QRS DURATION: 144 MS
QTC CALCULATION (BEZET): 482 MS
R AXIS: 92 DEGREES
RBC # BLD AUTO: 3.63 X10(6)UL (ref 3.8–5.8)
RED CELL DISTRIBUTION WIDTH-SD: 65.9 FL (ref 35.1–46.3)
SODIUM SERPL-SCNC: 142 MMOL/L (ref 136–144)
SP GR UR STRIP.AUTO: 1.01 (ref 1–1.03)
T AXIS: -38 DEGREES
TRIGL SERPL-MCNC: 67 MG/DL (ref 30–149)
TROPONIN I SERPL-MCNC: 0.06 NG/ML (ref ?–0.05)
UROBILINOGEN UR STRIP.AUTO-MCNC: <2 MG/DL
VENTRICULAR RATE: 60 BPM
VLDLC SERPL CALC-MCNC: 13 MG/DL (ref 0–30)
WBC # BLD AUTO: 3.1 X10(3) UL (ref 4–13)
WBC #/AREA URNS AUTO: >50 /HPF

## 2018-08-23 PROCEDURE — 99291 CRITICAL CARE FIRST HOUR: CPT | Performed by: OTHER

## 2018-08-23 PROCEDURE — 30283B1 TRANSFUSION OF NONAUTOLOGOUS 4-FACTOR PROTHROMBIN COMPLEX CONCENTRATE INTO VEIN, PERCUTANEOUS APPROACH: ICD-10-PCS | Performed by: EMERGENCY MEDICINE

## 2018-08-23 PROCEDURE — 95819 EEG AWAKE AND ASLEEP: CPT | Performed by: OTHER

## 2018-08-23 PROCEDURE — 72125 CT NECK SPINE W/O DYE: CPT | Performed by: EMERGENCY MEDICINE

## 2018-08-23 PROCEDURE — 70450 CT HEAD/BRAIN W/O DYE: CPT | Performed by: EMERGENCY MEDICINE

## 2018-08-23 PROCEDURE — 99253 IP/OBS CNSLTJ NEW/EST LOW 45: CPT | Performed by: NEUROLOGICAL SURGERY

## 2018-08-23 PROCEDURE — 99291 CRITICAL CARE FIRST HOUR: CPT | Performed by: HOSPITALIST

## 2018-08-23 RX ORDER — MORPHINE SULFATE 4 MG/ML
2 INJECTION, SOLUTION INTRAMUSCULAR; INTRAVENOUS EVERY 2 HOUR PRN
Status: DISCONTINUED | OUTPATIENT
Start: 2018-08-23 | End: 2018-08-29

## 2018-08-23 RX ORDER — ACETAMINOPHEN 650 MG/1
650 SUPPOSITORY RECTAL EVERY 4 HOURS PRN
COMMUNITY

## 2018-08-23 RX ORDER — PHENYLEPHRINE HCL IN 0.9% NACL 50MG/250ML
PLASTIC BAG, INJECTION (ML) INTRAVENOUS CONTINUOUS PRN
Status: DISCONTINUED | OUTPATIENT
Start: 2018-08-23 | End: 2018-08-26 | Stop reason: HOSPADM

## 2018-08-23 RX ORDER — DOCUSATE SODIUM 100 MG/1
100 CAPSULE, LIQUID FILLED ORAL 2 TIMES DAILY
Status: DISCONTINUED | OUTPATIENT
Start: 2018-08-23 | End: 2018-08-29

## 2018-08-23 RX ORDER — LEVETIRACETAM 500 MG/1
500 TABLET ORAL 2 TIMES DAILY
Status: DISCONTINUED | OUTPATIENT
Start: 2018-08-23 | End: 2018-08-23

## 2018-08-23 RX ORDER — HYOSCYAMINE SULFATE 0.125 MG
0.12 TABLET,DISINTEGRATING ORAL EVERY 4 HOURS PRN
COMMUNITY

## 2018-08-23 RX ORDER — OXYCODONE HYDROCHLORIDE 5 MG/1
2.5 TABLET ORAL SEE ADMIN INSTRUCTIONS
COMMUNITY

## 2018-08-23 RX ORDER — POLYETHYLENE GLYCOL 3350 17 G/17G
17 POWDER, FOR SOLUTION ORAL DAILY PRN
Status: DISCONTINUED | OUTPATIENT
Start: 2018-08-23 | End: 2018-08-29

## 2018-08-23 RX ORDER — MORPHINE SULFATE 4 MG/ML
1 INJECTION, SOLUTION INTRAMUSCULAR; INTRAVENOUS EVERY 2 HOUR PRN
Status: DISCONTINUED | OUTPATIENT
Start: 2018-08-23 | End: 2018-08-29

## 2018-08-23 RX ORDER — SODIUM PHOSPHATE, DIBASIC AND SODIUM PHOSPHATE, MONOBASIC 7; 19 G/133ML; G/133ML
1 ENEMA RECTAL ONCE AS NEEDED
Status: DISCONTINUED | OUTPATIENT
Start: 2018-08-23 | End: 2018-08-29

## 2018-08-23 RX ORDER — MIRTAZAPINE 30 MG/1
30 TABLET, FILM COATED ORAL NIGHTLY
Status: ON HOLD | COMMUNITY
End: 2018-08-29

## 2018-08-23 RX ORDER — ACETAMINOPHEN 650 MG/1
650 SUPPOSITORY RECTAL EVERY 4 HOURS PRN
Status: DISCONTINUED | OUTPATIENT
Start: 2018-08-23 | End: 2018-08-29

## 2018-08-23 RX ORDER — METOCLOPRAMIDE HYDROCHLORIDE 5 MG/ML
5 INJECTION INTRAMUSCULAR; INTRAVENOUS EVERY 8 HOURS PRN
Status: DISCONTINUED | OUTPATIENT
Start: 2018-08-23 | End: 2018-08-29

## 2018-08-23 RX ORDER — LORAZEPAM 1 MG/1
2 TABLET ORAL EVERY 4 HOURS PRN
COMMUNITY

## 2018-08-23 RX ORDER — LABETALOL HYDROCHLORIDE 5 MG/ML
10 INJECTION, SOLUTION INTRAVENOUS EVERY 10 MIN PRN
Status: DISCONTINUED | OUTPATIENT
Start: 2018-08-23 | End: 2018-08-29

## 2018-08-23 RX ORDER — LORAZEPAM 2 MG/ML
1 INJECTION INTRAMUSCULAR
Status: DISCONTINUED | OUTPATIENT
Start: 2018-08-23 | End: 2018-08-29

## 2018-08-23 RX ORDER — ACETAMINOPHEN 325 MG/1
650 TABLET ORAL EVERY 4 HOURS PRN
Status: DISCONTINUED | OUTPATIENT
Start: 2018-08-23 | End: 2018-08-29

## 2018-08-23 RX ORDER — ONDANSETRON 2 MG/ML
4 INJECTION INTRAMUSCULAR; INTRAVENOUS EVERY 6 HOURS PRN
Status: DISCONTINUED | OUTPATIENT
Start: 2018-08-23 | End: 2018-08-29

## 2018-08-23 RX ORDER — HALOPERIDOL 1 MG/1
0.5 TABLET ORAL
COMMUNITY

## 2018-08-23 RX ORDER — SENNOSIDES 8.6 MG
17.2 TABLET ORAL NIGHTLY
Status: DISCONTINUED | OUTPATIENT
Start: 2018-08-23 | End: 2018-08-29

## 2018-08-23 RX ORDER — BISACODYL 10 MG
10 SUPPOSITORY, RECTAL RECTAL
Status: DISCONTINUED | OUTPATIENT
Start: 2018-08-23 | End: 2018-08-29

## 2018-08-23 RX ORDER — LEVETIRACETAM 500 MG/1
500 TABLET ORAL 2 TIMES DAILY
COMMUNITY

## 2018-08-23 RX ORDER — FAMOTIDINE 20 MG/1
20 TABLET ORAL DAILY
Status: DISCONTINUED | OUTPATIENT
Start: 2018-08-23 | End: 2018-08-29

## 2018-08-23 RX ORDER — PHENOBARBITAL 32.4 MG/1
32.4 TABLET ORAL 3 TIMES DAILY
Status: DISCONTINUED | OUTPATIENT
Start: 2018-08-23 | End: 2018-08-23

## 2018-08-23 RX ORDER — WARFARIN SODIUM 1 MG/1
1 TABLET ORAL DAILY
Status: ON HOLD | COMMUNITY
End: 2018-08-29

## 2018-08-23 RX ORDER — LAMOTRIGINE 100 MG/1
300 TABLET ORAL DAILY
COMMUNITY

## 2018-08-23 RX ORDER — SODIUM CHLORIDE 9 MG/ML
INJECTION, SOLUTION INTRAVENOUS CONTINUOUS
Status: DISCONTINUED | OUTPATIENT
Start: 2018-08-23 | End: 2018-08-25

## 2018-08-23 RX ORDER — LANSOPRAZOLE 30 MG/1
30 CAPSULE, DELAYED RELEASE ORAL
COMMUNITY

## 2018-08-23 RX ORDER — PHENOBARBITAL 32.4 MG/1
32.4 TABLET ORAL 3 TIMES DAILY
COMMUNITY

## 2018-08-23 RX ORDER — FAMOTIDINE 10 MG/ML
20 INJECTION, SOLUTION INTRAVENOUS DAILY
Status: DISCONTINUED | OUTPATIENT
Start: 2018-08-23 | End: 2018-08-29

## 2018-08-23 RX ORDER — BISACODYL 10 MG
10 SUPPOSITORY, RECTAL RECTAL 2 TIMES DAILY PRN
COMMUNITY

## 2018-08-23 RX ORDER — AMOXICILLIN 250 MG
1 CAPSULE ORAL 2 TIMES DAILY PRN
COMMUNITY

## 2018-08-23 RX ORDER — PHENOBARBITAL SODIUM 65 MG/ML
32.4 INJECTION INTRAMUSCULAR 3 TIMES DAILY
Status: DISCONTINUED | OUTPATIENT
Start: 2018-08-23 | End: 2018-08-29

## 2018-08-23 NOTE — CONSULTS
[facility]    Report of Consultation    Debbie Graham Patient Status:  Inpatient    1940 MRN [id number]   [facility] 6NE-A Attending Az Tobias MD   Hosp Day # 0 PCP PHYSICIAN NONSTAFF     Date of Admission:  2018 Intravenous, Continuous PRN  •  phenylephrine in NaCl (UBALDO-SYNEPHRINE) 50 mg/250 ml premix infusion SOLN, 100-200 mcg/min, Intravenous, Continuous PRN  •  Senna (SENOKOT) tab TABS 17.2 mg, 17.2 mg, Oral, Nightly  •  docusate sodium (COLACE) cap 100 mg, 100 Stress Test:   Cath:   CTA Chest:   CXR:     Labs:     Lab Results  Component Value Date   WBC 3.1 08/23/2018   RBC 3.63 08/23/2018   HGB 10.4 08/23/2018   HCT 32.1 08/23/2018   MCV 88.4 08/23/2018   MCH 28.7 08/23/2018   MCHC 32.4 08/23/2018   RDW 20.3

## 2018-08-23 NOTE — ED INITIAL ASSESSMENT (HPI)
Fell 2 days ago and yesterday, hitting head both times. Positive LOC yesterday with fall. s/p brain surgery in april, coumadin.

## 2018-08-23 NOTE — CONSULTS
[facility]  Neurosurgery Consult    Naheed Florez Patient Status:  Inpatient    1940 MRN [id number]   [facility] 6NE-A Attending Carmella Escobedo MD   Hosp Day # 0 PCP PHYSICIAN NONSTAFF     REASON FOR CONSULTATION:  Fall, traum levETIRAcetam 500 MG Oral Tab Take 500 mg by mouth 2 (two) times daily.  Disp:  Rfl:  Taking   lansoprazole 30 MG Oral Capsule Delayed Release Take 30 mg by mouth every morning before breakfast. Disp:  Rfl:  8/22/2018 at Unknown time   haloperidol 1 MG Or sulfate (PF) 4 MG/ML injection 1 mg 1 mg Intravenous Q2H PRN   Or      morphINE sulfate (PF) 4 MG/ML injection 2 mg 2 mg Intravenous Q2H PRN   Labetalol HCl (TRANDATE) injection 10 mg 10 mg Intravenous Q10 Min PRN   niCARdipine HCl in NaCl (CARDENE) 20 mg/ is intact. Shrug shoulders normally bilaterally.       Motor: R side 4-4+ with poor effort, L 5/5, R drift   Sensation: intact to light touch bilaterally   Reflexes: 2+, no clonus, no small's   Coordination: deferred    Gait: deferred  Incision well-hea

## 2018-08-23 NOTE — PROGRESS NOTES
08/23/18 8092   Clinical Encounter Type   Visited With Patient   Routine Visit Introduction   Continue Visiting No   Patient's Supportive Strategies/Resources  provided emotional support.    Patient Spiritual Encounters   Spiritual Needs

## 2018-08-23 NOTE — CM/SW NOTE
SWATI was reviewing previous encounters for pt and found notes from [facility] [phone]. [facility] and they have pt as a current pt of theirs and were unaware he travelled to [place]. Their last visit was on 8/17.     SWATI left message for wife to

## 2018-08-23 NOTE — H&P
[facility] H[facility]PITAL[facility]  History and Physical     Elo Garcia Patient Status:  Inpatient    1940 MRN [id number]   [facility] 6NE-A Attending Shruthi Raymond MD   Hosp Day # 0 PCP PHYSICIAN NONSTAFF     Chief Complaint: fall with wors mouth daily. Hold for systolic blood pressure less than 100  Disp:  Rfl:    Atorvastatin Calcium 20 MG Oral Tab Take 20 mg by mouth nightly.  Disp:  Rfl:      Physical Exam:    /86   Pulse 75   Temp 97.9 °F (36.6 °C) (Temporal)   Resp 19   Ht 162.6 cm above  3. Mechanical AVR/MVR- on coumadin with supratherapeutic INR-plan as above and cardiology consulted  4. Chronic systolic CHF- EF 39%-AMVBC compensated at this time  5.  Old CVA with residual right sided weakness- much worse per family at this time  6

## 2018-08-23 NOTE — CONSULTS
[facility]  Neurocritical Care       Name: Lysle Brunner  MRN: [id number]  Admission Date/Time: 8/23/2018  9:13 AM  Primary Care Provider:  PHYSICIAN NONSTAFF        Reason for Consultation:   Traumatic Fall and SDH/SAH    HPI:   Benton Alcantara At risk for falling         Date Noted: 02/26/2017      ANGELICA (acute kidney injury) ([facility] [address]      Mechanical heart valve present      Chronic systolic heart failure [facility])      Past Medical History:   Diagnosis Date   • Diabetes [facility])    • Essential hypertension 70, temperature 98 °F (36.7 °C), temperature source Temporal, resp. rate 18, height 162.6 cm (5' 4\"), weight 150 lb (68 kg), SpO2 98 %. Body mass index is 25.75 kg/m².     Intake/Output:  No intake or output data in the 24 hours ending 08/23/18 1124    HE craniotomy defect. there is small subarachnoid hemorrhage are also noted in the left sylvian fissure (image 28). Lucencies in the deep periventricular white matter are likely sequelae of chronic small vessel ischemic disease.  Prominence of the left temporal Traumatic  SDH  Small SAH  H/o seizures  hypercoag with INR 5    Plan:  Neuro:  - Neuro checks Q 1hr  - Pain Meds - as above  - PT/OT and Speech Therapy when appropriate  - ICH Order Set.  - Nicardipine IV Drip to SBP Goal 100-160mm Hg or MAP <110.  Give La and [address] - In affiliation with [facility]. Board Certified in Neurology, Vascular Neurology(Stroke), Neurocritical Care and Headache Medicine.

## 2018-08-23 NOTE — ED PROVIDER NOTES
Patient Seen in: [facility] Emergency Department    History   Patient presents with:  Head Neck Injury (neurologic, musculoskeletal)    Stated Complaint: head injury, fall s/p brain surgery in april, coumadin    HPI    Patient is a 27-year-old male Packs/day: 0.00      Years: 0.00      Smokeless tobacco: Never Used                      Alcohol use:  No                Review of Systems    Positive for stated complaint: head injury, fall s/p brain surgery in april, coumadin  Other systems are vitals reviewed.          ED Course     Labs Reviewed   COMP METABOLIC PANEL (14) - Abnormal; Notable for the following:        Result Value    Glucose 133 (*)     Potassium 5.2 (*)     BUN 27 (*)     Creatinine 1.67 (*)     Calculated Osmolality 301 (*) INDICATIONS:  head injury, fall s/p brain surgery in april, coumadin  TECHNIQUE:  Noncontrast CT scanning is performed through the brain. Dose reduction techniques were used.  Dose information is transmitted to the [facility] of Radiology) Ashish Kumar 35 ( ED Course as of Aug 23 1102  ------------------------------------------------------------      [facility]   68-year-old male who is on Coumadin for mechanical heart valves, presenting with lethargy and right leg weakness after falls 2 days ago and another one [facility]) T19.6O7O 8/23/2018 Unknown

## 2018-08-23 NOTE — PLAN OF CARE
1330-received from ed-pt drowsy but eyes open, speech slurred/garbled answering with 1-2 word. follows simple commands. Left extrems stronger than right. +drift on right, appears to have right field cut-no response to visual threat on right side.  Monitor

## 2018-08-24 LAB
ANION GAP SERPL CALC-SCNC: 8 MMOL/L (ref 0–18)
APTT PPP: 35.6 SECONDS (ref 26.1–34.6)
BASOPHILS # BLD AUTO: 0.02 X10(3) UL (ref 0–0.1)
BASOPHILS NFR BLD AUTO: 0.8 %
BUN BLD-MCNC: 23 MG/DL (ref 8–20)
BUN/CREAT SERPL: 19.2 (ref 10–20)
CALCIUM BLD-MCNC: 8.1 MG/DL (ref 8.3–10.3)
CHLORIDE SERPL-SCNC: 114 MMOL/L (ref 101–111)
CO2 SERPL-SCNC: 23 MMOL/L (ref 22–32)
CREAT BLD-MCNC: 1.2 MG/DL (ref 0.7–1.3)
EOSINOPHIL # BLD AUTO: 0.09 X10(3) UL (ref 0–0.3)
EOSINOPHIL NFR BLD AUTO: 3.8 %
ERYTHROCYTE [DISTWIDTH] IN BLOOD BY AUTOMATED COUNT: 19.9 % (ref 11.5–16)
GLUCOSE BLD-MCNC: 79 MG/DL (ref 70–99)
GLUCOSE BLD-MCNC: 81 MG/DL (ref 65–99)
GLUCOSE BLD-MCNC: 85 MG/DL (ref 65–99)
GLUCOSE BLD-MCNC: 89 MG/DL (ref 65–99)
HAV IGM SER QL: 1.5 MG/DL (ref 1.8–2.5)
HCT VFR BLD AUTO: 28.7 % (ref 37–53)
HGB BLD-MCNC: 9.1 G/DL (ref 13–17)
IMMATURE GRANULOCYTE COUNT: 0.01 X10(3) UL (ref 0–1)
IMMATURE GRANULOCYTE RATIO %: 0.4 %
INR BLD: 1.17 (ref 0.9–1.1)
LYMPHOCYTES # BLD AUTO: 0.56 X10(3) UL (ref 0.9–4)
LYMPHOCYTES NFR BLD AUTO: 23.4 %
MCH RBC QN AUTO: 28.5 PG (ref 27–33.2)
MCHC RBC AUTO-ENTMCNC: 31.7 G/DL (ref 31–37)
MCV RBC AUTO: 90 FL (ref 80–99)
MONOCYTES # BLD AUTO: 0.31 X10(3) UL (ref 0.1–1)
MONOCYTES NFR BLD AUTO: 13 %
NEUTROPHIL ABS PRELIM: 1.4 X10 (3) UL (ref 1.3–6.7)
NEUTROPHILS # BLD AUTO: 1.4 X10(3) UL (ref 1.3–6.7)
NEUTROPHILS NFR BLD AUTO: 58.6 %
OSMOLALITY SERPL CALC.SUM OF ELEC: 303 MOSM/KG (ref 275–295)
PHOSPHATE SERPL-MCNC: 2.5 MG/DL (ref 2.5–4.9)
PLATELET # BLD AUTO: 58 10(3)UL (ref 150–450)
POTASSIUM SERPL-SCNC: 3.8 MMOL/L (ref 3.6–5.1)
PSA SERPL DL<=0.01 NG/ML-MCNC: 15.4 SECONDS (ref 12.4–14.7)
RBC # BLD AUTO: 3.19 X10(6)UL (ref 3.8–5.8)
RED CELL DISTRIBUTION WIDTH-SD: 65.9 FL (ref 35.1–46.3)
SODIUM SERPL-SCNC: 145 MMOL/L (ref 136–144)
WBC # BLD AUTO: 2.4 X10(3) UL (ref 4–13)

## 2018-08-24 PROCEDURE — 99291 CRITICAL CARE FIRST HOUR: CPT | Performed by: OTHER

## 2018-08-24 PROCEDURE — 99233 SBSQ HOSP IP/OBS HIGH 50: CPT | Performed by: HOSPITALIST

## 2018-08-24 NOTE — PLAN OF CARE
Pt received resting in bed, sleeping but easy to arouse. Pt only intermittently cooperates with neuro exam. Able to state name but not place or time. Pt moves all extremities, right weaker than left. Pupils are equal and reactive.  Pt refuses to have g-tube

## 2018-08-24 NOTE — PROGRESS NOTES
[facility]  Neurocritical Care       Subjective: Nehemiah Garza is a(n) 66year old male with h/o fall and subsequent L craniectomy and bleed. On coumadin for mechanical heart valve.  S/p 2+ more falls at home w/o LOC. NSGY on consult through the brain. Dose reduction techniques were used. Dose information is transmitted to the [facility] [employer] of Radiology) NRDR ([address]) which includes the Dose Index Registry.   PATIENT STATED HISTORY: (As transcribed by [employer]) NRDR ([address]) which includes the Dose Index Registry. PATIENT STATED HISTORY: (As transcribed by Technologist)  Patient fell two days ago and yesterday hitting head both times. Recent brain sx on coumadin. scanning of the cervical spine is performed from the skull base through C7. Multiplanar reconstructions are generated. Dose reduction techniques were used.  Dose information is transmitted to the [facility] of Radiology) Ashish Kumar [facility] foraminal narrowing secondary to uncovertebral osteophytes. Normal facet joints. C7-T1:  No significant disc/facet abnormality or spinal stenosis. Mild left facet hypertrophy. CONCLUSION:  1. No fracture, subluxation or acute abnormality.  2. Degener HCl (TRANDATE) injection 10 mg 10 mg Intravenous Q10 Min PRN   niCARdipine HCl in NaCl (CARDENE) 20 mg/200 ml premix infusion 5-15 mg/hr Intravenous Continuous PRN   phenylephrine in NaCl (UBALDO-SYNEPHRINE) 50 mg/250 ml premix infusion SOLN 100-200 mcg/min I slowing but no seziures  - Hold all antiplatelets and anticoagulants as of now. Reversal of INR with Vit. K and K-Centra on admit  - CT Brain as above            - Repeat CT Brain is stable  - Neurosurgery Consulted. - Daily CBC, BMP and Mg.  LFT's Q3 days

## 2018-08-24 NOTE — PHYSICAL THERAPY NOTE
Order received for PT eval. Attempted to see Pt this am, however, Pt with INR of 5.59 and not appropriate for therapy per department protocol. Will follow up 8/25/18.

## 2018-08-24 NOTE — CM/SW NOTE
MSW was able to speak with Anurag Borges on the phone very briefly as she is on her way to work. Per Anurag Borges, today is not a good day to talk. She informed MSW she would be available over the weekend or on Monday morning.   MSW explained that weekend SWATI hernández p

## 2018-08-24 NOTE — CONSULTS
Received palliative care consultation order. Spoke with patient's spouse/Molly via phone. Andrew Howe is unable to participate in a goals of care conversation until Monday (8/27/18) at 0900. Consultation note will follow meeting on Monday.   Thank you for

## 2018-08-24 NOTE — PAYOR COMM NOTE
--------------  ADMISSION REVIEW     Payor: [address]  Subscriber #:  [id number]  Authorization Number: N/A    Admit date: 8/23/18  Admit time: 5       Admitting Physician: Kriss Su MD  Attending Physician:  MD PARDEEP Coleman Essential hypertension    • H/O heart valve replacement with mechanical valve        Past Surgical History:  04/2018: BRAIN SURGERY  No date: VALVE REPAIR  No date: VALVE REPLACEMENT              Review of Systems    Positive for stated complaint: head inj Psychiatric: He has a normal mood and affect. Nursing note and vitals reviewed.          ED Course     Labs Reviewed   COMP METABOLIC PANEL (14) - Abnormal; Notable for the following:        Result Value    Glucose 133 (*)     Potassium 5.2 (*)     BUN consciousness but his wife reports he has been drooling, more lethargic than usual and having difficulty moving his right leg. On arrival the patient is awake and alert, following commands, one-word answers to questions.   Labs, CT brain and cervical spine being weaker. Wife tried to help him up and he fell again striking his head. There is no loss of consciousness on the subsequent fall. He had another fall in the bathroom and damaged the drywall.   Patient is able to follow simple commands but continues PLAN:   1. Acute SDH/SAH on coumadin  1. Admit to the neuro ICU  2. Patient given vitamin K and K Centra  3. Neurosurgery and neuro intensivist consulted  4. Serial neuro checks with stroke protocol  2.  Old craniotomy and evacuation in April at [facility] 8/23/2018 1302 New Bag 1000 mg Intravenous Seb Salas RN      levETIRAcetam (KEPPRA) 500 mg in sodium chloride 0.9 % 100 mL IVPB     Date Action Dose Route User    8/24/2018 0804 New Bag 500 mg Intravenous Boby Calderon RN    8/23/2018 2057 New Bag

## 2018-08-24 NOTE — PROGRESS NOTES
Assumed care at 0730. Patient received in bed, neurologically stable. Follows some commands. See flowsheet. Spoke with wife Dion Garcia. Plans to be here around 8 am tomorrow. Stated she is hopeful to have patient transferred to hospice here in [place].   Also

## 2018-08-24 NOTE — SLP NOTE
ADULT SWALLOWING EVALUATION    ASSESSMENT    ASSESSMENT/OVERALL IMPRESSION:  Patient admitted with a history of a fall. Patient with craniotomy and hemorrhagic evacuation in April at an outside hospital. He had been on hospice prior to admit.  He is current Acute subdural hematoma (HCC)  Active Problems:    Subarachnoid hemorrhage following injury, no loss of consciousness, initial encounter ([facility]    Coagulopathy ([facility]    Chronic atrial fibrillation [facility])      Past Medical History  Past Medical History:   Fiorella Limits       Pharyngeal Phase of Swallow: Within Functional Limits     (Please note: Silent aspiration cannot be evaluated clinically.  Videofluoroscopic Swallow Study is required to rule-out silent aspiration.)    Esophageal Phase of Swallow: No complaints

## 2018-08-24 NOTE — PROGRESS NOTES
[facility] HOSPITAL[facility]  Progress Note     Nicole Santamaria Patient Status:  Inpatient    1940 MRN [id number]   [facility] 6NE-A Attending Luis Greene MD   Hosp Day # 1 PCP PHYSICIAN NONSTAFF     Chief Complaint: CVA  S:  No overnight PHENobarbital Sodium  32.4 mg Intravenous TID     ASSESSMENT / PLAN:   1. Acute SDH/SAH on coumadin  1. Patient given vitamin K and K [facility]ra  2. Neurosurgery and neuro intensivist consulted   2.  Old craniotomy and evacuation in April at [facility]

## 2018-08-24 NOTE — CM/SW NOTE
MSW spoke with RN. The patient's wife is currently at work until 2pm today. MSW will attempt to call her after work today to complete dc planning assessment. Per report, the patient moved from [place] where he was active with hospice.   The patient's wife w

## 2018-08-24 NOTE — OCCUPATIONAL THERAPY NOTE
OT order received. No activity order currently present and pt with high INR. Will f/u again tomorrow as appropriate.

## 2018-08-24 NOTE — PLAN OF CARE
Patient transferred from CNICU at approximately 1600  Patient awake, alert to person, disoriented to place and time; confused  Right hemiplegia, right weakness noted  No acute focal neurological changes   Poor judgement; safety awareness;  High fall risk pr

## 2018-08-24 NOTE — PROGRESS NOTES
Reviewed with nursing staff. No issues overnight. More alert and conversive. Resting comfortably     Atrial fibrillation with well controlled rates.    132/79    Lungs clear anteriorly  Ht irregular  abd soft  Ext SCD's    23/1.2      Hgb 9.1 / plts 58

## 2018-08-24 NOTE — PROGRESS NOTES
[facility]  Neurosurgery Progress Note    Sofia Garcia Patient Status:  Inpatient    1940 MRN [id number]   [facility] 6NE-A Attending Lester Silva MD   Hosp Day # 1 PCP PHYSICIAN NONSTAFF     Subjective:  Sofia Garcia i anticoagulation if absolutely necessary but this is a significant risk given his fall history.      Jennifer Carrillo MD  Neurological Surgeon  [facility]  [address], [address], [address]

## 2018-08-25 LAB
ANION GAP SERPL CALC-SCNC: 13 MMOL/L (ref 0–18)
APTT PPP: 36.8 SECONDS (ref 26.1–34.6)
BASOPHILS # BLD AUTO: 0.03 X10(3) UL (ref 0–0.1)
BASOPHILS NFR BLD AUTO: 0.8 %
BUN BLD-MCNC: 21 MG/DL (ref 8–20)
BUN/CREAT SERPL: 18.1 (ref 10–20)
CALCIUM BLD-MCNC: 8.4 MG/DL (ref 8.3–10.3)
CHLORIDE SERPL-SCNC: 113 MMOL/L (ref 101–111)
CO2 SERPL-SCNC: 20 MMOL/L (ref 22–32)
CREAT BLD-MCNC: 1.16 MG/DL (ref 0.7–1.3)
EOSINOPHIL # BLD AUTO: 0.06 X10(3) UL (ref 0–0.3)
EOSINOPHIL NFR BLD AUTO: 1.7 %
ERYTHROCYTE [DISTWIDTH] IN BLOOD BY AUTOMATED COUNT: 20.3 % (ref 11.5–16)
GLUCOSE BLD-MCNC: 178 MG/DL (ref 65–99)
GLUCOSE BLD-MCNC: 220 MG/DL (ref 65–99)
GLUCOSE BLD-MCNC: 223 MG/DL (ref 65–99)
GLUCOSE BLD-MCNC: 67 MG/DL (ref 70–99)
GLUCOSE BLD-MCNC: 70 MG/DL (ref 65–99)
GLUCOSE BLD-MCNC: 71 MG/DL (ref 65–99)
HAV IGM SER QL: 1.7 MG/DL (ref 1.8–2.5)
HCT VFR BLD AUTO: 34.3 % (ref 37–53)
HGB BLD-MCNC: 10.7 G/DL (ref 13–17)
IMMATURE GRANULOCYTE COUNT: 0 X10(3) UL (ref 0–1)
IMMATURE GRANULOCYTE RATIO %: 0 %
LYMPHOCYTES # BLD AUTO: 0.73 X10(3) UL (ref 0.9–4)
LYMPHOCYTES NFR BLD AUTO: 20.3 %
MCH RBC QN AUTO: 28.5 PG (ref 27–33.2)
MCHC RBC AUTO-ENTMCNC: 31.2 G/DL (ref 31–37)
MCV RBC AUTO: 91.2 FL (ref 80–99)
MONOCYTES # BLD AUTO: 0.37 X10(3) UL (ref 0.1–1)
MONOCYTES NFR BLD AUTO: 10.3 %
NEUTROPHIL ABS PRELIM: 2.41 X10 (3) UL (ref 1.3–6.7)
NEUTROPHILS # BLD AUTO: 2.41 X10(3) UL (ref 1.3–6.7)
NEUTROPHILS NFR BLD AUTO: 66.9 %
OSMOLALITY SERPL CALC.SUM OF ELEC: 303 MOSM/KG (ref 275–295)
PLATELET # BLD AUTO: 64 10(3)UL (ref 150–450)
POTASSIUM SERPL-SCNC: 4.2 MMOL/L (ref 3.6–5.1)
RBC # BLD AUTO: 3.76 X10(6)UL (ref 3.8–5.8)
RED CELL DISTRIBUTION WIDTH-SD: 67.8 FL (ref 35.1–46.3)
SODIUM SERPL-SCNC: 146 MMOL/L (ref 136–144)
WBC # BLD AUTO: 3.6 X10(3) UL (ref 4–13)

## 2018-08-25 PROCEDURE — 99232 SBSQ HOSP IP/OBS MODERATE 35: CPT | Performed by: HOSPITALIST

## 2018-08-25 RX ORDER — DEXTROSE AND SODIUM CHLORIDE 5; .45 G/100ML; G/100ML
INJECTION, SOLUTION INTRAVENOUS CONTINUOUS
Status: DISCONTINUED | OUTPATIENT
Start: 2018-08-25 | End: 2018-08-25

## 2018-08-25 RX ORDER — MAGNESIUM SULFATE HEPTAHYDRATE 40 MG/ML
2 INJECTION, SOLUTION INTRAVENOUS ONCE
Status: COMPLETED | OUTPATIENT
Start: 2018-08-25 | End: 2018-08-25

## 2018-08-25 RX ORDER — DEXTROSE AND SODIUM CHLORIDE 5; .9 G/100ML; G/100ML
INJECTION, SOLUTION INTRAVENOUS CONTINUOUS
Status: DISCONTINUED | OUTPATIENT
Start: 2018-08-25 | End: 2018-08-25

## 2018-08-25 RX ORDER — SODIUM CHLORIDE 9 MG/ML
INJECTION, SOLUTION INTRAVENOUS CONTINUOUS
Status: DISCONTINUED | OUTPATIENT
Start: 2018-08-25 | End: 2018-08-29

## 2018-08-25 RX ORDER — DEXTROSE MONOHYDRATE 25 G/50ML
50 INJECTION, SOLUTION INTRAVENOUS
Status: DISCONTINUED | OUTPATIENT
Start: 2018-08-25 | End: 2018-08-29

## 2018-08-25 NOTE — CM/SW NOTE
08/25/18 1400   CM/SW Referral Data   Referral Source    Reason for Referral Discharge planning   Informant Patient;Spouse   Patient Info   Advanced directives? No   Information provided?  Yes   Patient's Mental Status Alert;Oriented   Patien

## 2018-08-25 NOTE — PROGRESS NOTES
[facility] HOSPITAL[facility]  Progress Note     Marella Prader Patient Status:  Inpatient    1940 MRN [id number]   [facility] 6NE-A Attending Rosemarie Hanson MD   Hosp Day # 2 PCP PHYSICIAN NONSTAFF     Chief Complaint: CVA  S:  No overnight Senna  17.2 mg Oral Nightly   • docusate sodium  100 mg Oral BID   • famoTIDine  20 mg Oral Daily    Or   • famoTIDine  20 mg Intravenous Daily   • levETIRAcetam  500 mg Intravenous Q12H   • PHENobarbital Sodium  32.4 mg Intravenous TID     ASSESSMENT / PL

## 2018-08-25 NOTE — PLAN OF CARE
Patient awake, confused.  Alert to self  Speech slurred and garbled at times  No acute focal neurological changes  RUE/RLE weakness noted; attempts made for right foot dorsi/plantar flexion  Complex neuro and NIH difficult to assess related to patient's  Patient/Family Long Term Goal Progressing    • Patient/Family Short Term Goal Progressing        SAFETY ADULT - FALL    • Free from fall injury Progressing

## 2018-08-25 NOTE — PLAN OF CARE
Assumed care of patient at 1900. Patient alert to self, not completely cooperative with assessment and neurologic checks, follows simple commands. Right sided weakness noted, unable to completely dorsi/plantar flex right foot.  Bunny boots applied for skin

## 2018-08-25 NOTE — PHYSICAL THERAPY NOTE
PHYSICAL THERAPY EVALUATION - INPATIENT     Room Number: 3404/3034-U  Evaluation Date: 8/25/2018  Type of Evaluation: Initial  Physician Order: PT Eval and Treat    Presenting Problem: SDH & SAH s/p fall  Reason for Therapy: Mobility Dysfunction and following injury, no loss of consciousness, initial encounter ([facility]    Coagulopathy ([facility]    Chronic atrial fibrillation [facility])      Past Medical History  Past Medical History:   Diagnosis Date   • Diabetes ([facility] [address]    • Essential hypertension    • H/O heart v strength is within functional limits except for the following:    Right Hip flexion  4-/5  Left Hip flexion  4-/5  Right Knee extension  4-/5  Left Knee extension  4/5  Right Knee flexion  4-/5  Left Knee flexion  4/5  Right Dorsiflexion  3+/5  Left Dorsif proper hand placement for transfer. Ambulated 3 steps bed->chair with Max Ax2. Fatigued very easily and req'd Max Ax2 for stand->sit onto bedside chair.   Pt left sitting in bedside chair with family present, PCT/RN aware and PCT planned to place chair al training;Balance training  Rehab Potential : Fair  Frequency (Obs): 5x/week  Number of Visits to Meet Established Goals: 5      CURRENT GOALS    Goal #1 Patient is able to demonstrate supine - sit EOB @ level: minimum assistance     Goal #2 Patient is able

## 2018-08-26 LAB
ANION GAP SERPL CALC-SCNC: 8 MMOL/L (ref 0–18)
APTT PPP: 41.1 SECONDS (ref 26.1–34.6)
BASOPHILS # BLD AUTO: 0.02 X10(3) UL (ref 0–0.1)
BASOPHILS NFR BLD AUTO: 0.6 %
BUN BLD-MCNC: 18 MG/DL (ref 8–20)
BUN/CREAT SERPL: 13.7 (ref 10–20)
CALCIUM BLD-MCNC: 8.2 MG/DL (ref 8.3–10.3)
CHLORIDE SERPL-SCNC: 114 MMOL/L (ref 101–111)
CO2 SERPL-SCNC: 23 MMOL/L (ref 22–32)
CREAT BLD-MCNC: 1.31 MG/DL (ref 0.7–1.3)
EOSINOPHIL # BLD AUTO: 0.06 X10(3) UL (ref 0–0.3)
EOSINOPHIL NFR BLD AUTO: 1.7 %
ERYTHROCYTE [DISTWIDTH] IN BLOOD BY AUTOMATED COUNT: 20.2 % (ref 11.5–16)
GLUCOSE BLD-MCNC: 119 MG/DL (ref 65–99)
GLUCOSE BLD-MCNC: 141 MG/DL (ref 65–99)
GLUCOSE BLD-MCNC: 148 MG/DL (ref 65–99)
GLUCOSE BLD-MCNC: 153 MG/DL (ref 70–99)
GLUCOSE BLD-MCNC: 166 MG/DL (ref 65–99)
HAV IGM SER QL: 2 MG/DL (ref 1.8–2.5)
HCT VFR BLD AUTO: 32.8 % (ref 37–53)
HGB BLD-MCNC: 10.5 G/DL (ref 13–17)
IMMATURE GRANULOCYTE COUNT: 0.01 X10(3) UL (ref 0–1)
IMMATURE GRANULOCYTE RATIO %: 0.3 %
LYMPHOCYTES # BLD AUTO: 0.58 X10(3) UL (ref 0.9–4)
LYMPHOCYTES NFR BLD AUTO: 16.5 %
MCH RBC QN AUTO: 28.5 PG (ref 27–33.2)
MCHC RBC AUTO-ENTMCNC: 32 G/DL (ref 31–37)
MCV RBC AUTO: 88.9 FL (ref 80–99)
MONOCYTES # BLD AUTO: 0.5 X10(3) UL (ref 0.1–1)
MONOCYTES NFR BLD AUTO: 14.2 %
NEUTROPHIL ABS PRELIM: 2.34 X10 (3) UL (ref 1.3–6.7)
NEUTROPHILS # BLD AUTO: 2.34 X10(3) UL (ref 1.3–6.7)
NEUTROPHILS NFR BLD AUTO: 66.7 %
OSMOLALITY SERPL CALC.SUM OF ELEC: 305 MOSM/KG (ref 275–295)
PLATELET # BLD AUTO: 64 10(3)UL (ref 150–450)
POTASSIUM SERPL-SCNC: 3.6 MMOL/L (ref 3.6–5.1)
RBC # BLD AUTO: 3.69 X10(6)UL (ref 3.8–5.8)
RED CELL DISTRIBUTION WIDTH-SD: 64.9 FL (ref 35.1–46.3)
SODIUM SERPL-SCNC: 145 MMOL/L (ref 136–144)
WBC # BLD AUTO: 3.5 X10(3) UL (ref 4–13)

## 2018-08-26 PROCEDURE — 99232 SBSQ HOSP IP/OBS MODERATE 35: CPT | Performed by: HOSPITALIST

## 2018-08-26 NOTE — OCCUPATIONAL THERAPY NOTE
Attempted to see pt for initial OT evaluation. Prior to entering pt's room, chart review indicated hospice care in [facility],  Followed by hospice care consultation pending currently.  OT contacted spouse and although spouse is pursuing hospice, wishes therapy t

## 2018-08-26 NOTE — PLAN OF CARE
Assumed care of pt @ 1930. A/O to self. Speech slurred and garbled at times. No acute neurological changes. RUE/RLE weakness noted; occasionally attempts to dorsi/planter flex.  Pt often refused to cooperate with neuro checks, does follow most simple comman

## 2018-08-26 NOTE — PHYSICAL THERAPY NOTE
PHYSICAL THERAPY TREATMENT NOTE - INPATIENT    Room Number: 0609/0935-M     Session: 1   Number of Visits to Meet Established Goals: 5    Presenting Problem: SDH & SAH s/p fall    History related to current admission: Pt is 66year old male admitted on 8/ pursuing hospice, wishes therapy to attempt to mobilize pt      OBJECTIVE  Precautions:  Other (Comment) (contact isolation)    WEIGHT BEARING RESTRICTION  Weight Bearing Restriction: None                PAIN ASSESSMENT   Rating: Unable to rate  Location: p from pt face agitates pt. Pt denies photo sensitivity. Pt able to follow commands to move B feet, however, pt becoming increasingly agitated as this writer educates pt on role of therapy and POC.  Pt adamantly refuses OOB and repeatedly brings blanket over level: moderate assistance      Goal #3 Patient is able to ambulate 25 feet with assist device: walker - rolling at assistance level: moderate assistance      Goal #4     Goal #5     Goal #6     Goal Comments: Goals established on 8/25/2018  Not progressin

## 2018-08-26 NOTE — PROGRESS NOTES
08/26/18 1441   Clinical Encounter Type   Visited With Health care provider  (XIOMARA Pritchett)   Routine Visit (REsponded to request for consult POLST)   Consulted with RN who said that patient is not able to complete POLST.   and RN found no PoA paperwo

## 2018-08-26 NOTE — HOSPICE RN NOTE
Spoke with Jeni Minors RN and asked about Hospice for this patient. RN told me the patient wife works nights and may be sleeping this AM.Called the spouse to set up meeting with [facility] today and mailbox was full. Will make another attempt later this

## 2018-08-26 NOTE — PROGRESS NOTES
[facility] HOSPITALIST  Progress Note     Astridmignon Mitchell Patient Status:  Inpatient    1940 MRN [id number]   [facility] 6NE-A Attending Lul Segovia MD   Hosp Day # 3 PCP PHYSICIAN NONSTAFF     Chief Complaint: CVA  S:  No overnight 52.2*  15.4*   INR  5.24*  5.59*  1.17*     Recent Labs   Lab  08/23/18   0927   TROP  0.059*        Imaging: Imaging data reviewed in Epic.   Medications:   • potassium chloride 40mEq IVPB (peripheral line)  40 mEq Intravenous Once   • ceFAZolin  1 g Intra

## 2018-08-26 NOTE — SLP NOTE
ADULT SWALLOWING EVALUATION    ASSESSMENT    ASSESSMENT/OVERALL IMPRESSION:  Pt seen for repeat BSSE due to level of participation and cooperation on 8/24/18. Pt was ok'd to have pureed consistency only following initial BSSE on 8/24.  Per the pt's RN today [facility])      Past Medical History  Past Medical History:   Diagnosis Date   • Diabetes ([address]    • Essential hypertension    • H/O heart valve replacement with mechanical valve        Prior Living Situation: Home with support;Home with spouse (Had been in [de-identified] please contact Jocelyn CaseRailsed

## 2018-08-27 LAB
GLUCOSE BLD-MCNC: 103 MG/DL (ref 65–99)
GLUCOSE BLD-MCNC: 115 MG/DL (ref 65–99)
GLUCOSE BLD-MCNC: 130 MG/DL (ref 65–99)
GLUCOSE BLD-MCNC: 141 MG/DL (ref 65–99)
POTASSIUM SERPL-SCNC: 4 MMOL/L (ref 3.6–5.1)

## 2018-08-27 PROCEDURE — 99232 SBSQ HOSP IP/OBS MODERATE 35: CPT | Performed by: HOSPITALIST

## 2018-08-27 NOTE — PHYSICAL THERAPY NOTE
PHYSICAL THERAPY TREATMENT NOTE - INPATIENT    Room Number: 3158/6216-P     Session: 2  Number of Visits to Meet Established Goals: 5    Presenting Problem: SDH & SAH s/p fall    History related to current admission: Pt is 66year old male admitted on 8/2 therapy to attempt to mobilize pt      OBJECTIVE  Precautions:  Other (Comment) (contact isolation)    WEIGHT BEARING RESTRICTION  Weight Bearing Restriction: None                PAIN ASSESSMENT   Rating: Unable to rate  Location: pt uncooperative   Managem ambulation however, pt becoming increasingly agitated, placed himself back in supine with supervision. Pt placed cover over his head. Conversation directed torwards spouse.  Spent time with spouse discussing role of PT, POC, DC planning/recs, positioning.,a feet with assist device: walker - rolling at assistance level: moderate assistance      Goal #4     Goal #5     Goal #6     Goal Comments: Goals established on 8/25/2018;ongoing 8/26/18  Not progressing

## 2018-08-27 NOTE — PLAN OF CARE
Assumed care pt @ 1930. Resting in bed. A/O to self. Unable to reorient. Pt with slurred speech, hard to comprehend at times. VSS. Safety precautions in place, bed alarm on. Isolation precautions maintained. Controlled Afib on tele. IVs patent, flushed.   V

## 2018-08-27 NOTE — PROGRESS NOTES
[facility] HOSPITALIST  Progress Note     Susan Barksdale Patient Status:  Inpatient    1940 MRN [id number]   [facility] 6NE-A Attending Chino Bonilla MD   Hosp Day # 4 PCP PHYSICIAN NONSTAFF     Chief Complaint: CVA  S:  No overnight --    --    --    BILT  0.5   --    --    --    --    TP  6.8   --    --    --    --      Recent Labs   Lab  08/23/18 0927 08/23/18   1159  08/24/18   0427   PTP  49.6*  52.2*  15.4*   INR  5.24*  5.59*  1.17*     Recent Labs   Lab  08/23/18 0927   TR

## 2018-08-27 NOTE — PROGRESS NOTES
08/27/18 1100   Clinical Encounter Type   Visited With Patient and family together   Referral To Nurse;Physician  (Physician: Please review and sign POLST form on chart, as appropriate.   Nurse:  Please dial [phone] and page [phone] to page a  to file s

## 2018-08-27 NOTE — HOSPICE RN NOTE
Met with wife and provided information regarding [facility] services. Brochures given.  Wife indicates she is not POA, son is, but she is trying to obtain POA here in hospital. She said she will need care giver and Thi Rojas advised and asked to give w

## 2018-08-27 NOTE — CM/SW NOTE
Met with spouse to discuss hospice goals and services. Sw provided her with contact information if she wants to move forward using our services.   Wife has only been taking care of pt for about a week due to her son dropping him off because he could not ca

## 2018-08-27 NOTE — DIETARY NOTE
[facility]    NUTRITION INITIAL ASSESSMENT    Pt does not meet malnutrition criteria.       NUTRITION DIAGNOSIS/PROBLEM:    Inadequate oral intake related to decreased ability to meet est needs as evidenced by decreased PO intake, reported wt loss    N moderate nutrition risk    FOLLOW-UP DATE: 8/30/18  Jacki Mcnamara RD, LDN  Pager [phone]

## 2018-08-27 NOTE — OCCUPATIONAL THERAPY NOTE
OCCUPATIONAL THERAPY EVALUATION - INPATIENT     Room Number: 7646/3361-B  Evaluation Date: 8/27/2018  Type of Evaluation: Initial  Presenting Problem: fall, SDH and [address]    Physician Order: IP Consult to Occupational Therapy  Reason for Therapy: ADL/IADL Dy REPLACEMENT    OCCUPATIONAL PROFILE    HOME SITUATION  Type of Home: House  Home Layout: Two level; Able to live on main level;Ramped entrance; Other (Comment) (hospital bed on main level but powder room only)  Lives With: Spouse    Toilet and Equipment:  Farrukh per observation    Upper extremity strength is within functional limit - per observation, pt is pulling and pushing away     COORDINATION  Gross Motor    unable to assess LE   Fine Motor    refusing assessment     ADDITIONAL TESTS steps just a few days ago. Informed RN about the session. Patient End of Session: In bed;Needs met;Call light within reach;RN aware of session/findings; All patient questions and concerns addressed; Family present    ASSESSMENT     Patient is a 66 year o reorientation;Patient/Family education;Patient/Family training;Equipment eval/education; Compensatory technique education  Rehab Potential : Guarded  Frequency (Obs):  (Trial 1-2 sessions)  Number of Visits to Meet Established Goals: Trial    ADL Goals   Pa

## 2018-08-27 NOTE — PROGRESS NOTES
08/27/18 1104   Clinical Encounter Type   Visited With Patient and family together   Referral To Nurse;Physician  (HPOA form completed/copy is in patient's chart listing spouse as POA/to make decisions.)

## 2018-08-27 NOTE — SLP NOTE
SPEECH DAILY NOTE - INPATIENT    ASSESSMENT & PLAN   ASSESSMENT  Pt seen for dysphagia tx to assess tolerance with recommended diet, ensure proper utilization of aspiration precautions and provide pt/family education.  Patient seen with chopped diet and thi

## 2018-08-28 LAB
GLUCOSE BLD-MCNC: 102 MG/DL (ref 65–99)
GLUCOSE BLD-MCNC: 138 MG/DL (ref 65–99)
GLUCOSE BLD-MCNC: 149 MG/DL (ref 65–99)
GLUCOSE BLD-MCNC: 99 MG/DL (ref 65–99)

## 2018-08-28 PROCEDURE — 99232 SBSQ HOSP IP/OBS MODERATE 35: CPT | Performed by: HOSPITALIST

## 2018-08-28 RX ORDER — HALOPERIDOL 5 MG/ML
1 INJECTION INTRAMUSCULAR EVERY 6 HOURS PRN
Status: DISCONTINUED | OUTPATIENT
Start: 2018-08-28 | End: 2018-08-29

## 2018-08-28 NOTE — PHYSICAL THERAPY NOTE
Attempted PT session, pt adamantly refusing mobility despite encouragement. Pt does not want to participate. Due to lack of participation this admission, pt is not appropriate for skilled IP physical therapy. Per [facility], pt is going on hospice care.  Based up

## 2018-08-28 NOTE — CM/SW NOTE
Call placed to pts wife. She has chosen [facility] for Yanira Palacios. She has requested a list of private duty caregivers as well - this will be emailed as she will not be coming to [facility] today. She works nights and is trying to get the next week off.  She i

## 2018-08-28 NOTE — PLAN OF CARE
Assumed care at 1900. Alert and oriented x1-2, confused. Telemetry monitor reading A-fib. IVF infusing assessed and maintained. Assessment remains unchanged from the beginning of shift.  Plan for home with hospice, [facility] vs residential. Call light in reach

## 2018-08-28 NOTE — PLAN OF CARE
Pt A/Ox self, on RA, AFib per tele, denies any pain  Pt incontinent of bladder and bowel, needs attended too  PLAN: hospice at discharge?   Will cont to monitor    Diabetes/Glucose Control    • Glucose maintained within prescribed range Progressing        I

## 2018-08-28 NOTE — PROGRESS NOTES
[facility] HOSPITALIST  Progress Note     [facility] Patient Status:  Inpatient    1940 MRN [id number]   [facility] 6NE-A Attending Alida Ware MD   Hosp Day # 5 PCP PHYSICIAN NONSTAFF     Chief Complaint: CVA  S:  No overnight --    --    --    TP  6.8   --    --    --    --      Recent Labs   Lab  08/23/18 0927 08/23/18   1159  08/24/18   0427   PTP  49.6*  52.2*  15.4*   INR  5.24*  5.59*  1.17*     Recent Labs   Lab  08/23/18 0927   TROP  0.059*        Imaging: Imaging d

## 2018-08-29 VITALS
HEART RATE: 94 BPM | OXYGEN SATURATION: 98 % | DIASTOLIC BLOOD PRESSURE: 96 MMHG | SYSTOLIC BLOOD PRESSURE: 149 MMHG | RESPIRATION RATE: 18 BRPM | TEMPERATURE: 98 F | HEIGHT: 64 IN | WEIGHT: 134.25 LBS | BODY MASS INDEX: 22.92 KG/M2

## 2018-08-29 LAB
GLUCOSE BLD-MCNC: 109 MG/DL (ref 65–99)
GLUCOSE BLD-MCNC: 133 MG/DL (ref 65–99)

## 2018-08-29 PROCEDURE — 99239 HOSP IP/OBS DSCHRG MGMT >30: CPT | Performed by: INTERNAL MEDICINE

## 2018-08-29 NOTE — PAYOR COMM NOTE
--------------  CONTINUED STAY REVIEW    Payor: [address]  Subscriber #:  [id number]  Authorization Number: [id number]    Admit date: 8/23/18  Admit time: 5    Admitting Physician: Shruthi Raymond MD  Attending Physician:  Gifty Doran will contact the wife shortly to arrange to have consents signed. The wife states she can receive the pt at home today if any necessary equipment can be arranged.           MEDICATIONS ADMINISTERED IN LAST 1 DAY:  ceFAZolin (ANCEF) IVPB 1g/100ml in 0.9% NaC

## 2018-08-29 NOTE — CM/SW NOTE
Consents have been signed for the pt begin services with [facility] [phone].  They are requesting the pt leave the hospital by [facility] or [facility] [phone] has been requested to arrange ambulance for  time of

## 2018-08-29 NOTE — PLAN OF CARE
NURSING DISCHARGE NOTE    Discharged Home via Ambulance. Accompanied by Support staff  Belongings Taken by patient/family. Patient being discharge home with discharge, all questions and concerns answered by RN and  for wife.  DNR form gi

## 2018-08-29 NOTE — PROGRESS NOTES
[facility]  Progress Note    Venkata Valdivia Patient Status:  Inpatient    1940 MRN [id number]   [facility] 7NE-A Attending Precious Elias MD   [facility] Day # 6 PCP PHYSICIAN NONSTAFF     CC: Fall at home with worsening ability to am Ventricular rate 60  BPM Final   Atrial rate 54  BPM Final   P-R Interval  ms Final   QRS Duration 144  ms Final   Q-T Interval 482  ms Final   QTC Calculation (Bezet) 482  ms Final   P Axis  degrees Final   R Axis 92  degrees Final   T Axis -38  degrees less dense, measuring about 4 mm. Continued demonstration of encephalomalacia throughout multiple areas in the left hemisphere with associated compensatory dilation left lateral ventricle. Chronic ischemic white matter changes bilaterally.        No 3350 (MIRALAX) powder packet 17 g 17 g Oral Daily PRN   magnesium hydroxide (MILK OF MAGNESIA) 400 MG/5ML suspension 30 mL 30 mL Oral Daily PRN   bisacodyl (DULCOLAX) rectal suppository 10 mg 10 mg Rectal Daily PRN   FLEET ENEMA (FLEET) 7-19 GM/118ML enema Prophylaxis: SCD  · CODE status: DNR      Estimated date of discharge: To be decided based on discharge planning, hospice  At this point Mr. Phong Wright  is expected to be discharge to: Hospice        Questions/concerns and Plan of care were discussed with domenic

## 2018-08-29 NOTE — PLAN OF CARE
Pt A/Ox self, on RA, AFib per tele, denies any pain  Pt incontinent of bladder and bowel, needs attended too  PLAN: hospice at discharge  Will cont to monitor    Diabetes/Glucose Control    • Glucose maintained within prescribed range Adequate for Discharg

## 2018-08-29 NOTE — CM/SW NOTE
Patients wife is present at the bedside at this time. She has notified this CM that she will be able to take the pt home. She has secured friends who will be able to sit with the pt while she is working. She is also attempting to get some time off work.  Ca

## 2018-08-29 NOTE — PLAN OF CARE
Pt A/O to self. Agitated and impulsive at times. Seizure precautions maintained. RA. A fib on tele. Denies pain. Pt is sleeping comfortably. Will continue to monitor.      Diabetes/Glucose Control    • Glucose maintained within prescribed range Progressing

## 2018-08-29 NOTE — CM/SW NOTE
08/29/18 1400   Discharge disposition   Expected discharge disposition Hospice/Home   Name of [address]   Discharge transportation [employer]

## 2018-08-29 NOTE — DISCHARGE SUMMARY
[facility]  Discharge Summary    Sofia Iroquois Patient Status:  Inpatient    1940 MRN [id number]   [facility] 7NE-A Attending No att. providers found   Hosp Day # 6 PCP PHYSICIAN NONSTAFF     Date of Admission: 2018    Shaggy unwitnessed fall 2 days ago. Wife is noticed there is been dragging his right foot and being weaker. Wife tried to help him up and he fell again striking his head. There is no loss of consciousness on the subsequent fall.   He had another fall in the bat time and do not want to be restarted on anticoagulation due to subdural hemorrhage and subarachnoid hemorrhage with increased risk of bleeding with increased fall risk, patient and wife both understand the need of anticoagulation with AVR/MVR on chronic at Take 30 mg by mouth every morning before breakfast.   Refills:  0     levETIRAcetam 500 MG Tabs  Commonly known as:  KEPPRA      Take 500 mg by mouth 2 (two) times daily.    Refills:  0     LORazepam 1 MG Tabs  Commonly known as:  ATIVAN      Take 2 mg by m 68849  [phone]      cardiology          Physical Exam:  BP (!) 149/96 (BP Location: Right arm)   Pulse 94   Temp 98.1 °F (36.7 °C) (Oral)   Resp 18   Ht 5' 4\" (1.626 m)   Wt 134 lb 4.2 oz (60.9 kg)   SpO2 98%   BMI 23.05 kg/m²       General appearanc

## 2018-08-30 NOTE — PAYOR COMM NOTE
--------------  DISCHARGE REVIEW    Payor: [facility]  Subscriber #:  [id number]  Authorization Number: [id number]    Admit date: 8/23/18  Admit time:  2605  Discharge Date: 8/29/2018  3:42 PM     Admitting Physician: Kriss Su MD  [facility]

## 2018-09-07 NOTE — PAYOR COMM NOTE
--CONTINUED STAY REVIEW    Payor: [address]  Subscriber #:  [id number]  Authorization Number: [id number]     Admit date: 8/23/18  Admit time: 5     Admitting Physician: Terrance Berumen MD  Attending Physician:  Yanelis Hancock MD  Primary C will contact the wife shortly to arrange to have consents signed.  The wife states she can receive the pt at home today if any necessary equipment can be arranged.           DISCHARGE REVIEW    Payor: [address] [facility]  Subscriber #:  [id number]  A Route User     8/29/2018 0836 New Bag 500 mg Intravenous Marcel Mena RN     8/28/2018 2108 New Bag 500 mg Intravenous Lexie Oliveira RN                         PHENobarbital Sodium (LUMINAL) injection 32.4 mg      Date Action Dose Route User     8/29/

## 2020-04-10 NOTE — PROGRESS NOTES
ADMIT FROM ER, PT STABLE. ADMISSION NAVIGATOR COMPLETED- PATIENT DOES NOT KNOW HOME MEDS AND WIFE IS NOT ANSWERING PHONE, NO FAMILY AT BEDSIDE.  PATIENT STATES HIS PHARMACY  [facility]- MED LIST RECEIVED AND ONLY A FEW MEDICATIONS HAVE BEEN Principal Discharge DX:	Chest pain

## 2022-02-11 NOTE — PROCEDURES
ELECTROENCEPHALOGRAM REPORT      Patient Name: Nicole Santamaria  Chart ID: [id number]  Ordering Physician: . Date of Test: 8/23/2018    A routine EEG was obtained. No sedation was given.     Hx: Left temporal crani, seizures    Abnormal E ·  Plan: Pt has history of anemia (baseline around 10 in Jan'22)  On admission, hemoglobin was 9.8 > 8.9 > 9.8 > 9.8 this AM  monitor CBC K 3.3   -replaced  resolved  Monitor BMP

## (undated) NOTE — IP AVS SNAPSHOT
[facility] [place] [address] [place], [address] ~ [phone]                Discharge Summary   2/26/2017    Darien Salas           Admission Information        Provider Department    2/26/2017 Pat Benavides MD  [facility]                              Oseltamivir Phosphate 75 MG Caps   Last time this was given:  75 mg on 2/28/2017  8:29 AM   Commonly known as:  TAMIFLU        Take 1 capsule (75 mg total) by mouth daily.    Stop taking on:  3/2/2017    Efra hydrALAzine HCl 25 MG Tabs   Last time this was given:  25 mg on 2/28/2017  2:57 PM   Commonly known as:  APRESOLINE        Take 25 mg by mouth 3 (three) times daily.                                   lamoTRIgine 150 MG Tabs   Last time this was given:  2/ Follow up with Rachel Lowry MD In 2 weeks. Specialty:  CARDIOLOGY    Contact information:    [address] [id number] [phone]          Follow up with Nihsa Vásquez MD On 3/13/2017.     Special 16.7 (02/27/17)  0.2 (02/27/17)  0.4  (02/27/17)  3.81 (02/27/17)  0.63 (L) (02/27/17)  0.90 (H) (02/27/17)  0.01 (02/27/17)  0.02    (02/26/17)  77.4 (02/26/17)  8.5 (02/26/17)  12.9 (02/26/17)  0.3 (02/26/17)  0.3  (02/26/17)  5.39 (02/26/17)  0.59 (L) ( [address]. MyChart     Sign up for [employer], your secure online medical record. [employer] will allow you to access patient instructions from your recent visit,  view other health information, and more.  To sign up or find more information, lisinopril 5 MG Oral Tab    carvedilol 3.125 MG Oral Tab         Use: Treat abnormal blood pressure (high or low), cardiac conditions; and/or abnormal heart rates/rhythms   Most common side effects: Dizziness or feeling lightheaded (especially with standi Albuterol Sulfate  (90 Base) MCG/ACT Inhalation Aero Soln    benzonatate 100 MG Oral Cap    GuaiFENesin ER (MUCINEX) 600 MG Oral Tablet 12 Hr       Use:  Treatment of asthma, COPD/emphysema, cough, allergies   Most common side effects: Headache, na

## (undated) NOTE — IP AVS SNAPSHOT
[address]            (For Outpatient Use Only) Initial Admit Date: 8/23/2018   Inpt/Obs Admit Date: Inpt: 8/23/18 / Obs: N/A   Discharge Date:    Hospital Acct:  [de-identified]   MRN: [de-identified]   CSN: [id number]        ENCOUNTER  Patient Subscriber Name:  Miguel Angel Singhn :    Subscriber ID:  Pt Rel to Subscriber:    Hospital Account Financial Class: [facility] [place] [facility]    2018